# Patient Record
Sex: FEMALE | Race: WHITE | URBAN - METROPOLITAN AREA
[De-identification: names, ages, dates, MRNs, and addresses within clinical notes are randomized per-mention and may not be internally consistent; named-entity substitution may affect disease eponyms.]

---

## 2018-01-16 PROBLEM — Z3A.38 38 WEEKS GESTATION OF PREGNANCY: Status: ACTIVE | Noted: 2018-01-16

## 2018-01-22 PROBLEM — Z3A.39 39 WEEKS GESTATION OF PREGNANCY: Status: ACTIVE | Noted: 2018-01-22

## 2018-01-23 PROBLEM — Z3A.38 38 WEEKS GESTATION OF PREGNANCY: Status: RESOLVED | Noted: 2018-01-16 | Resolved: 2018-01-23

## 2019-01-25 LAB

## 2019-08-01 LAB
BASOPHILS ABSOLUTE: NORMAL
BASOPHILS RELATIVE PERCENT: NORMAL
EOSINOPHILS ABSOLUTE: NORMAL
EOSINOPHILS RELATIVE PERCENT: NORMAL
HCT VFR BLD CALC: NORMAL %
HEMOGLOBIN: NORMAL
LYMPHOCYTES ABSOLUTE: NORMAL
LYMPHOCYTES RELATIVE PERCENT: NORMAL
MCH RBC QN AUTO: NORMAL PG
MCHC RBC AUTO-ENTMCNC: NORMAL G/DL
MCV RBC AUTO: NORMAL FL
MONOCYTES ABSOLUTE: NORMAL
MONOCYTES RELATIVE PERCENT: NORMAL
NEUTROPHILS ABSOLUTE: NORMAL
NEUTROPHILS RELATIVE PERCENT: NORMAL
PLATELET # BLD: NORMAL 10*3/UL
PMV BLD AUTO: NORMAL FL
RBC # BLD: NORMAL 10*6/UL
VITAMIN B-12: 912
WBC # BLD: NORMAL 10*3/UL

## 2020-07-15 ENCOUNTER — OFFICE VISIT (OUTPATIENT)
Dept: CARDIOLOGY CLINIC | Age: 35
End: 2020-07-15
Payer: COMMERCIAL

## 2020-07-15 VITALS
SYSTOLIC BLOOD PRESSURE: 108 MMHG | HEART RATE: 79 BPM | BODY MASS INDEX: 22.62 KG/M2 | TEMPERATURE: 98.6 F | HEIGHT: 61 IN | RESPIRATION RATE: 16 BRPM | WEIGHT: 119.8 LBS | DIASTOLIC BLOOD PRESSURE: 80 MMHG

## 2020-07-15 PROCEDURE — 93000 ELECTROCARDIOGRAM COMPLETE: CPT | Performed by: INTERNAL MEDICINE

## 2020-07-15 PROCEDURE — 99204 OFFICE O/P NEW MOD 45 MIN: CPT | Performed by: INTERNAL MEDICINE

## 2020-07-15 RX ORDER — POTASSIUM CHLORIDE 750 MG/1
10 TABLET, EXTENDED RELEASE ORAL DAILY
Qty: 30 TABLET | Refills: 3 | Status: ON HOLD | OUTPATIENT
Start: 2020-07-15 | End: 2021-09-01 | Stop reason: HOSPADM

## 2020-07-15 RX ORDER — FUROSEMIDE 20 MG/1
20 TABLET ORAL DAILY
Qty: 30 TABLET | Refills: 3 | Status: ON HOLD | OUTPATIENT
Start: 2020-07-15 | End: 2021-09-01 | Stop reason: HOSPADM

## 2020-07-15 NOTE — PROGRESS NOTES
CHIEF COMPLAINT: Palpitations/Edema    HISTORY OF PRESENT ILLNESS: Patient is a 28 y.o. female seen at the request of Aba Avilez MD.      Palpitations. Started in January. Skipped beats, not racing. No syncope or near syncope. Edema. For several years. Intermittent. One son, no issues. Past Medical History:   Diagnosis Date    Clotting disorder [facility])        Patient Active Problem List   Diagnosis    39 weeks gestation of pregnancy       No Known Allergies    Current Outpatient Medications   Medication Sig Dispense Refill    ibuprofen (ADVIL;MOTRIN) 800 MG tablet Take 1 tablet by mouth every 8 hours as needed for Pain 40 tablet 1    aspirin 81 MG tablet Take 81 mg by mouth daily      Heparin Sodium, Porcine, (HEPARIN, PORCINE,) 00860 UNIT/ML injection Inject 0.25 mLs into the skin every 12 hours (Patient not taking: Reported on 7/15/2020) 30 vial 1    Insulin Syringe-Needle U-100 ([employer] INSULIN SYRINGE .5CC/30G) 30G X 1/2\" 0.5 ML MISC 1 each by Does not apply route daily (Patient not taking: Reported on 7/15/2020) 100 each 0    Prenatal Vit-Fe Fumarate-FA (PRENATAL VITAMIN PO) Take 1 tablet by mouth daily      ferrous sulfate 325 (65 Fe) MG tablet Take 325 mg by mouth Daily      docusate sodium (COLACE) 100 MG capsule Take 100 mg by mouth Daily      ranitidine (ZANTAC) 150 MG tablet Take 150 mg by mouth Daily       No current facility-administered medications for this visit.         Social History     Socioeconomic History    Marital status:      Spouse name: Not on file    Number of children: Not on file    Years of education: Not on file    Highest education level: Not on file   Occupational History    Not on file   Social Needs    Financial resource strain: Not on file    Food insecurity     Worry: Not on file     Inability: Not on file    Transportation needs     Medical: Not on file     Non-medical: Not on file   Tobacco Use    Smoking status: Former Smoker Packs/day: 0.50     Years: 10.00     Pack years: 5.00     Types: Cigarettes    Smokeless tobacco: Never Used   Substance and Sexual Activity    Alcohol use: No    Drug use: No    Sexual activity: Not on file   Lifestyle    Physical activity     Days per week: Not on file     Minutes per session: Not on file    Stress: Not on file   Relationships    Social connections     Talks on phone: Not on file     Gets together: Not on file     Attends [employer] service: Not on file     Active member of club or organization: Not on file     Attends meetings of clubs or organizations: Not on file     Relationship status: Not on file    Intimate partner violence     Fear of current or ex partner: Not on file     Emotionally abused: Not on file     Physically abused: Not on file     Forced sexual activity: Not on file   Other Topics Concern    Not on file   Social History Narrative    Not on file       Family History   Problem Relation Age of Onset    Asthma Mother     Heart Disease Mother     High Blood Pressure Mother     High Blood Pressure Father        Review of Systems:  Heart: as above   Lungs: as above   Eyes: denies changes in vision or discharge. Ears: denies changes in hearing or pain. Nose: denies epistaxis or masses   Throat: denies sore throat or trouble swallowing. Neuro: denies numbness, tingling, tremors. Skin: denies rashes or itching. : denies hematuria, dysuria   GI: denies vomiting, diarrhea   Psych: denies mood changed, anxiety, depression. All other systems negative. Physical Exam   /80   Pulse 79   Temp 98.6 °F (37 °C)   Resp 16   Ht 5' 1\" (1.549 m)   Wt 119 lb 12.8 oz (54.3 kg)   BMI 22.64 kg/m²   Constitutional: Oriented to person, place, and time. Well-developed and well-nourished. No distress. Head: Normocephalic and atraumatic. Eyes: EOM are normal. Pupils are equal, round, and reactive to light. Neck: Normal range of motion. Neck supple.  No hepatojugular reflux and no JVD present. Carotid bruit is not present. No tracheal deviation present. No thyromegaly present. Cardiovascular: Normal rate, regular rhythm, normal heart sounds and intact distal pulses. Exam reveals no gallop and no friction rub. No murmur heard. Pulmonary/Chest: Effort normal and breath sounds normal. No respiratory distress. No wheezes. No rales. No tenderness. Abdominal: Soft. Bowel sounds are normal. No distension and no mass. No tenderness. No rebound and no guarding. Musculoskeletal: Normal range of motion. No edema and no tenderness. Lymphadenopathy:   No cervical adenopathy. No groin adenopathy. Neurological: Alert and oriented to person, place, and time. Skin: Skin is warm and dry. No rash noted. Not diaphoretic. No erythema. Psychiatric: Normal mood and affect. Behavior is normal.     EKG:  normal sinus rhythm, nonspecific ST and T waves changes. ASSESSMENT AND PLAN:  Patient Active Problem List   Diagnosis    39 weeks gestation of pregnancy     1. Palpitations: Suggest PAC's or PVC's. Echo. Observe. 2. Edema: Echo. PRN lasix. 3. Hypercoag state: Takes ASA. Sho Deutsch D.O.   Cardiologist  Cardiology, [facility]

## 2021-02-11 VITALS
HEART RATE: 96 BPM | RESPIRATION RATE: 96 BRPM | BODY MASS INDEX: 23.92 KG/M2 | WEIGHT: 130 LBS | DIASTOLIC BLOOD PRESSURE: 90 MMHG | SYSTOLIC BLOOD PRESSURE: 140 MMHG | HEIGHT: 62 IN

## 2021-02-11 RX ORDER — LANOLIN ALCOHOL/MO/W.PET/CERES
1000 CREAM (GRAM) TOPICAL EVERY MORNING
COMMUNITY
End: 2022-11-02

## 2021-08-16 ENCOUNTER — HOSPITAL ENCOUNTER (OUTPATIENT)
Age: 36
Setting detail: OBSERVATION
Discharge: HOME | End: 2021-08-16
Attending: OBSTETRICS & GYNECOLOGY | Admitting: OBSTETRICS & GYNECOLOGY
Payer: COMMERCIAL

## 2021-08-16 VITALS
HEART RATE: 77 BPM | SYSTOLIC BLOOD PRESSURE: 125 MMHG | RESPIRATION RATE: 16 BRPM | DIASTOLIC BLOOD PRESSURE: 76 MMHG | TEMPERATURE: 98.1 F

## 2021-08-16 PROBLEM — O36.8390 NON-REASSURING FETAL HEART RATE OR RHYTHM AFFECTING MOTHER: Status: ACTIVE | Noted: 2021-08-16

## 2021-08-16 PROCEDURE — 99211 OFF/OP EST MAY X REQ PHY/QHP: CPT

## 2021-08-16 PROCEDURE — 59025 FETAL NON-STRESS TEST: CPT

## 2021-08-16 NOTE — PROGRESS NOTES
Pt is a  that presents to l&d from Dr. Lois Hernandez office for monitoring due to [address] . Pt denies any vb or lof. States +FM.  EFM applied

## 2021-08-16 NOTE — PROGRESS NOTES
Per Dr June Davis, NST reactive and pt may be discharged home at this time.  Follow up in the office for regularly scheduled appointment

## 2021-08-16 NOTE — H&P
Matias Rodriguez is a 39 y.o. female patient  who presented from office for prolonged monitoring due to NR NST in office being done for thrombophilia. No diagnosis found. Past Medical History:   Diagnosis Date    Clotting disorder ([address]     Palpitations      OB History        4    Para   1    Term   1            AB   2    Living           SAB   2    TAB        Ectopic        Molar        Multiple        Live Births                  37w0d  Estimated Date of Delivery: 21  No Known Allergies  Active Problems:    Non-reassuring fetal heart rate or rhythm affecting mother  Resolved Problems:    * No resolved hospital problems. *    unknown if currently breastfeeding.     History  Exam   Deferred  NST reactive with baseline of 130  Assessment & Plan  IUP @ 37 weeks  Thrombophilia  NR NST in office  NST now reactive, viewed by Dr Mary Dove for discharge  Rey Shelton MD  2021

## 2021-08-27 ENCOUNTER — ANESTHESIA EVENT (OUTPATIENT)
Dept: LABOR AND DELIVERY | Age: 36
End: 2021-08-27
Payer: COMMERCIAL

## 2021-08-30 ENCOUNTER — HOSPITAL ENCOUNTER (INPATIENT)
Age: 36
LOS: 2 days | Discharge: HOME | End: 2021-09-01
Attending: OBSTETRICS & GYNECOLOGY | Admitting: OBSTETRICS & GYNECOLOGY
Payer: COMMERCIAL

## 2021-08-30 ENCOUNTER — ANESTHESIA (OUTPATIENT)
Dept: LABOR AND DELIVERY | Age: 36
End: 2021-08-30
Payer: COMMERCIAL

## 2021-08-30 VITALS — SYSTOLIC BLOOD PRESSURE: 112 MMHG | OXYGEN SATURATION: 98 % | DIASTOLIC BLOOD PRESSURE: 60 MMHG

## 2021-08-30 DIAGNOSIS — G89.18 POST-OPERATIVE PAIN: Primary | ICD-10-CM

## 2021-08-30 LAB
ABO/RH: NORMAL
AMPHETAMINE SCREEN, URINE: NOT DETECTED
ANTIBODY SCREEN: NORMAL
APTT: 27.4 SEC (ref 24.5–35.1)
BARBITURATE SCREEN URINE: NOT DETECTED
BENZODIAZEPINE SCREEN, URINE: NOT DETECTED
CANNABINOID SCREEN URINE: NOT DETECTED
COCAINE METABOLITE SCREEN URINE: NOT DETECTED
FENTANYL SCREEN, URINE: NOT DETECTED
HCT VFR BLD CALC: 35.9 % (ref 34–48)
HEMOGLOBIN: 12.1 G/DL (ref 11.5–15.5)
INR BLD: 1
Lab: NORMAL
MCH RBC QN AUTO: 30.9 PG (ref 26–35)
MCHC RBC AUTO-ENTMCNC: 33.7 % (ref 32–34.5)
MCV RBC AUTO: 91.8 FL (ref 80–99.9)
METHADONE SCREEN, URINE: NOT DETECTED
OPIATE SCREEN URINE: NOT DETECTED
OXYCODONE URINE: NOT DETECTED
PDW BLD-RTO: 14.3 FL (ref 11.5–15)
PHENCYCLIDINE SCREEN URINE: NOT DETECTED
PLATELET # BLD: 203 E9/L (ref 130–450)
PMV BLD AUTO: 11.4 FL (ref 7–12)
PROTHROMBIN TIME: 11 SEC (ref 9.3–12.4)
RBC # BLD: 3.91 E12/L (ref 3.5–5.5)
WBC # BLD: 12.4 E9/L (ref 4.5–11.5)

## 2021-08-30 PROCEDURE — 7100000001 HC PACU RECOVERY - ADDTL 15 MIN: Performed by: OBSTETRICS & GYNECOLOGY

## 2021-08-30 PROCEDURE — 80307 DRUG TEST PRSMV CHEM ANLYZR: CPT

## 2021-08-30 PROCEDURE — 85610 PROTHROMBIN TIME: CPT

## 2021-08-30 PROCEDURE — 2709999900 HC NON-CHARGEABLE SUPPLY: Performed by: OBSTETRICS & GYNECOLOGY

## 2021-08-30 PROCEDURE — 6370000000 HC RX 637 (ALT 250 FOR IP)

## 2021-08-30 PROCEDURE — 2580000003 HC RX 258: Performed by: OBSTETRICS & GYNECOLOGY

## 2021-08-30 PROCEDURE — 6360000002 HC RX W HCPCS: Performed by: ANESTHESIOLOGY

## 2021-08-30 PROCEDURE — 85730 THROMBOPLASTIN TIME PARTIAL: CPT

## 2021-08-30 PROCEDURE — 86901 BLOOD TYPING SEROLOGIC RH(D): CPT

## 2021-08-30 PROCEDURE — 6360000002 HC RX W HCPCS: Performed by: OBSTETRICS & GYNECOLOGY

## 2021-08-30 PROCEDURE — 86900 BLOOD TYPING SEROLOGIC ABO: CPT

## 2021-08-30 PROCEDURE — 7100000000 HC PACU RECOVERY - FIRST 15 MIN: Performed by: OBSTETRICS & GYNECOLOGY

## 2021-08-30 PROCEDURE — 3700000000 HC ANESTHESIA ATTENDED CARE: Performed by: OBSTETRICS & GYNECOLOGY

## 2021-08-30 PROCEDURE — 2500000003 HC RX 250 WO HCPCS: Performed by: NURSE ANESTHETIST, CERTIFIED REGISTERED

## 2021-08-30 PROCEDURE — 3700000001 HC ADD 15 MINUTES (ANESTHESIA): Performed by: OBSTETRICS & GYNECOLOGY

## 2021-08-30 PROCEDURE — 85027 COMPLETE CBC AUTOMATED: CPT

## 2021-08-30 PROCEDURE — 86850 RBC ANTIBODY SCREEN: CPT

## 2021-08-30 PROCEDURE — 3609079900 HC CESAREAN SECTION: Performed by: OBSTETRICS & GYNECOLOGY

## 2021-08-30 PROCEDURE — 6360000002 HC RX W HCPCS

## 2021-08-30 PROCEDURE — 6360000002 HC RX W HCPCS: Performed by: NURSE ANESTHETIST, CERTIFIED REGISTERED

## 2021-08-30 PROCEDURE — 36415 COLL VENOUS BLD VENIPUNCTURE: CPT

## 2021-08-30 PROCEDURE — 1220000000 HC SEMI PRIVATE OB R&B

## 2021-08-30 RX ORDER — SODIUM CHLORIDE 9 MG/ML
25 INJECTION, SOLUTION INTRAVENOUS PRN
Status: DISCONTINUED | OUTPATIENT
Start: 2021-08-30 | End: 2021-09-02 | Stop reason: HOSPADM

## 2021-08-30 RX ORDER — ONDANSETRON 2 MG/ML
INJECTION INTRAMUSCULAR; INTRAVENOUS PRN
Status: DISCONTINUED | OUTPATIENT
Start: 2021-08-30 | End: 2021-08-30 | Stop reason: SDUPTHER

## 2021-08-30 RX ORDER — HYDROCODONE BITARTRATE AND ACETAMINOPHEN 5; 325 MG/1; MG/1
2 TABLET ORAL EVERY 4 HOURS PRN
Status: ACTIVE | OUTPATIENT
Start: 2021-08-30 | End: 2021-08-31

## 2021-08-30 RX ORDER — KETOROLAC TROMETHAMINE 30 MG/ML
15 INJECTION, SOLUTION INTRAMUSCULAR; INTRAVENOUS EVERY 6 HOURS PRN
Status: DISPENSED | OUTPATIENT
Start: 2021-08-30 | End: 2021-08-31

## 2021-08-30 RX ORDER — IBUPROFEN 600 MG/1
600 TABLET ORAL EVERY 6 HOURS PRN
Status: DISCONTINUED | OUTPATIENT
Start: 2021-08-30 | End: 2021-09-02 | Stop reason: HOSPADM

## 2021-08-30 RX ORDER — SODIUM CHLORIDE, SODIUM LACTATE, POTASSIUM CHLORIDE, CALCIUM CHLORIDE 600; 310; 30; 20 MG/100ML; MG/100ML; MG/100ML; MG/100ML
INJECTION, SOLUTION INTRAVENOUS CONTINUOUS
Status: DISCONTINUED | OUTPATIENT
Start: 2021-08-30 | End: 2021-08-30 | Stop reason: SDUPTHER

## 2021-08-30 RX ORDER — TRISODIUM CITRATE DIHYDRATE AND CITRIC ACID MONOHYDRATE 500; 334 MG/5ML; MG/5ML
30 SOLUTION ORAL ONCE
Status: COMPLETED | OUTPATIENT
Start: 2021-08-30 | End: 2021-08-30

## 2021-08-30 RX ORDER — SODIUM CHLORIDE 0.9 % (FLUSH) 0.9 %
10 SYRINGE (ML) INJECTION EVERY 12 HOURS SCHEDULED
Status: DISCONTINUED | OUTPATIENT
Start: 2021-08-30 | End: 2021-08-30 | Stop reason: SDUPTHER

## 2021-08-30 RX ORDER — DOCUSATE SODIUM 100 MG/1
100 CAPSULE, LIQUID FILLED ORAL 2 TIMES DAILY
Status: DISCONTINUED | OUTPATIENT
Start: 2021-08-30 | End: 2021-09-02 | Stop reason: HOSPADM

## 2021-08-30 RX ORDER — PROMETHAZINE HYDROCHLORIDE 25 MG/ML
6.25 INJECTION, SOLUTION INTRAMUSCULAR; INTRAVENOUS
Status: DISCONTINUED | OUTPATIENT
Start: 2021-08-30 | End: 2021-08-30 | Stop reason: HOSPADM

## 2021-08-30 RX ORDER — OXYCODONE HYDROCHLORIDE AND ACETAMINOPHEN 5; 325 MG/1; MG/1
2 TABLET ORAL EVERY 4 HOURS PRN
Status: DISCONTINUED | OUTPATIENT
Start: 2021-08-30 | End: 2021-09-02 | Stop reason: HOSPADM

## 2021-08-30 RX ORDER — BUPIVACAINE HYDROCHLORIDE 7.5 MG/ML
INJECTION, SOLUTION INTRASPINAL PRN
Status: DISCONTINUED | OUTPATIENT
Start: 2021-08-30 | End: 2021-08-30 | Stop reason: SDUPTHER

## 2021-08-30 RX ORDER — TRISODIUM CITRATE DIHYDRATE AND CITRIC ACID MONOHYDRATE 500; 334 MG/5ML; MG/5ML
SOLUTION ORAL
Status: COMPLETED
Start: 2021-08-30 | End: 2021-08-30

## 2021-08-30 RX ORDER — FENTANYL CITRATE 50 UG/ML
50 INJECTION, SOLUTION INTRAMUSCULAR; INTRAVENOUS EVERY 5 MIN PRN
Status: DISCONTINUED | OUTPATIENT
Start: 2021-08-30 | End: 2021-08-30 | Stop reason: HOSPADM

## 2021-08-30 RX ORDER — SODIUM CHLORIDE 0.9 % (FLUSH) 0.9 %
5-40 SYRINGE (ML) INJECTION EVERY 12 HOURS SCHEDULED
Status: DISCONTINUED | OUTPATIENT
Start: 2021-08-30 | End: 2021-09-02 | Stop reason: HOSPADM

## 2021-08-30 RX ORDER — ONDANSETRON 2 MG/ML
4 INJECTION INTRAMUSCULAR; INTRAVENOUS EVERY 6 HOURS PRN
Status: DISCONTINUED | OUTPATIENT
Start: 2021-08-30 | End: 2021-08-30 | Stop reason: HOSPADM

## 2021-08-30 RX ORDER — OXYCODONE HYDROCHLORIDE AND ACETAMINOPHEN 5; 325 MG/1; MG/1
1 TABLET ORAL EVERY 4 HOURS PRN
Status: DISCONTINUED | OUTPATIENT
Start: 2021-08-30 | End: 2021-09-02 | Stop reason: HOSPADM

## 2021-08-30 RX ORDER — SODIUM CHLORIDE 0.9 % (FLUSH) 0.9 %
10 SYRINGE (ML) INJECTION PRN
Status: DISCONTINUED | OUTPATIENT
Start: 2021-08-30 | End: 2021-08-30 | Stop reason: SDUPTHER

## 2021-08-30 RX ORDER — KETOROLAC TROMETHAMINE 30 MG/ML
INJECTION, SOLUTION INTRAMUSCULAR; INTRAVENOUS PRN
Status: DISCONTINUED | OUTPATIENT
Start: 2021-08-30 | End: 2021-08-30 | Stop reason: SDUPTHER

## 2021-08-30 RX ORDER — SODIUM CHLORIDE, SODIUM LACTATE, POTASSIUM CHLORIDE, AND CALCIUM CHLORIDE .6; .31; .03; .02 G/100ML; G/100ML; G/100ML; G/100ML
1000 INJECTION, SOLUTION INTRAVENOUS ONCE
Status: COMPLETED | OUTPATIENT
Start: 2021-08-30 | End: 2021-08-30

## 2021-08-30 RX ORDER — SODIUM CHLORIDE, SODIUM LACTATE, POTASSIUM CHLORIDE, CALCIUM CHLORIDE 600; 310; 30; 20 MG/100ML; MG/100ML; MG/100ML; MG/100ML
INJECTION, SOLUTION INTRAVENOUS CONTINUOUS
Status: DISCONTINUED | OUTPATIENT
Start: 2021-08-30 | End: 2021-09-02 | Stop reason: HOSPADM

## 2021-08-30 RX ORDER — ONDANSETRON 4 MG/1
4 TABLET, ORALLY DISINTEGRATING ORAL EVERY 8 HOURS PRN
Status: DISCONTINUED | OUTPATIENT
Start: 2021-08-30 | End: 2021-09-02 | Stop reason: HOSPADM

## 2021-08-30 RX ORDER — PHENYLEPHRINE HCL IN 0.9% NACL 1 MG/10 ML
SYRINGE (ML) INTRAVENOUS PRN
Status: DISCONTINUED | OUTPATIENT
Start: 2021-08-30 | End: 2021-08-30 | Stop reason: SDUPTHER

## 2021-08-30 RX ORDER — MODIFIED LANOLIN
OINTMENT (GRAM) TOPICAL
Status: DISCONTINUED | OUTPATIENT
Start: 2021-08-30 | End: 2021-09-02 | Stop reason: HOSPADM

## 2021-08-30 RX ORDER — SODIUM CHLORIDE 9 MG/ML
25 INJECTION, SOLUTION INTRAVENOUS PRN
Status: DISCONTINUED | OUTPATIENT
Start: 2021-08-30 | End: 2021-08-30 | Stop reason: SDUPTHER

## 2021-08-30 RX ORDER — SODIUM CHLORIDE 0.9 % (FLUSH) 0.9 %
5-40 SYRINGE (ML) INJECTION PRN
Status: DISCONTINUED | OUTPATIENT
Start: 2021-08-30 | End: 2021-09-02 | Stop reason: HOSPADM

## 2021-08-30 RX ORDER — FERROUS SULFATE 325(65) MG
325 TABLET ORAL 2 TIMES DAILY WITH MEALS
Status: DISCONTINUED | OUTPATIENT
Start: 2021-08-31 | End: 2021-09-02 | Stop reason: HOSPADM

## 2021-08-30 RX ORDER — FENTANYL CITRATE 50 UG/ML
25 INJECTION, SOLUTION INTRAMUSCULAR; INTRAVENOUS EVERY 5 MIN PRN
Status: DISCONTINUED | OUTPATIENT
Start: 2021-08-30 | End: 2021-08-30 | Stop reason: HOSPADM

## 2021-08-30 RX ORDER — MORPHINE SULFATE 1 MG/ML
INJECTION, SOLUTION EPIDURAL; INTRATHECAL; INTRAVENOUS PRN
Status: DISCONTINUED | OUTPATIENT
Start: 2021-08-30 | End: 2021-08-30 | Stop reason: SDUPTHER

## 2021-08-30 RX ORDER — NALBUPHINE HCL 10 MG/ML
5 AMPUL (ML) INJECTION EVERY 4 HOURS PRN
Status: DISCONTINUED | OUTPATIENT
Start: 2021-08-30 | End: 2021-08-30 | Stop reason: HOSPADM

## 2021-08-30 RX ORDER — PRENATAL WITH FERROUS FUM AND FOLIC ACID 3080; 920; 120; 400; 22; 1.84; 3; 20; 10; 1; 12; 200; 27; 25; 2 [IU]/1; [IU]/1; MG/1; [IU]/1; MG/1; MG/1; MG/1; MG/1; MG/1; MG/1; UG/1; MG/1; MG/1; MG/1; MG/1
1 TABLET ORAL DAILY
Status: DISCONTINUED | OUTPATIENT
Start: 2021-08-31 | End: 2021-09-02 | Stop reason: HOSPADM

## 2021-08-30 RX ORDER — ONDANSETRON 2 MG/ML
4 INJECTION INTRAMUSCULAR; INTRAVENOUS EVERY 6 HOURS PRN
Status: ACTIVE | OUTPATIENT
Start: 2021-08-30 | End: 2021-08-31

## 2021-08-30 RX ORDER — HYDROCODONE BITARTRATE AND ACETAMINOPHEN 5; 325 MG/1; MG/1
1 TABLET ORAL EVERY 4 HOURS PRN
Status: DISPENSED | OUTPATIENT
Start: 2021-08-30 | End: 2021-08-31

## 2021-08-30 RX ORDER — SIMETHICONE 80 MG
80 TABLET,CHEWABLE ORAL EVERY 6 HOURS PRN
Status: DISCONTINUED | OUTPATIENT
Start: 2021-08-30 | End: 2021-09-02 | Stop reason: HOSPADM

## 2021-08-30 RX ORDER — ACETAMINOPHEN 325 MG/1
650 TABLET ORAL EVERY 4 HOURS PRN
Status: DISCONTINUED | OUTPATIENT
Start: 2021-08-30 | End: 2021-09-02 | Stop reason: HOSPADM

## 2021-08-30 RX ORDER — DIPHENHYDRAMINE HYDROCHLORIDE 50 MG/ML
25 INJECTION INTRAMUSCULAR; INTRAVENOUS EVERY 6 HOURS PRN
Status: DISCONTINUED | OUTPATIENT
Start: 2021-08-30 | End: 2021-08-31

## 2021-08-30 RX ADMIN — SODIUM CITRATE AND CITRIC ACID MONOHYDRATE 30 ML: 500; 334 SOLUTION ORAL at 19:19

## 2021-08-30 RX ADMIN — Medication 100 MCG: at 19:56

## 2021-08-30 RX ADMIN — SODIUM CHLORIDE, POTASSIUM CHLORIDE, SODIUM LACTATE AND CALCIUM CHLORIDE 1000 ML: 600; 310; 30; 20 INJECTION, SOLUTION INTRAVENOUS at 17:00

## 2021-08-30 RX ADMIN — Medication 100 MCG: at 19:37

## 2021-08-30 RX ADMIN — Medication 100 MCG: at 20:00

## 2021-08-30 RX ADMIN — SODIUM CHLORIDE, POTASSIUM CHLORIDE, SODIUM LACTATE AND CALCIUM CHLORIDE: 600; 310; 30; 20 INJECTION, SOLUTION INTRAVENOUS at 19:01

## 2021-08-30 RX ADMIN — ONDANSETRON 4 MG: 2 INJECTION INTRAMUSCULAR; INTRAVENOUS at 19:45

## 2021-08-30 RX ADMIN — Medication 999 ML/HR: at 19:45

## 2021-08-30 RX ADMIN — KETOROLAC TROMETHAMINE 30 MG: 30 INJECTION, SOLUTION INTRAMUSCULAR; INTRAVENOUS at 20:13

## 2021-08-30 RX ADMIN — Medication 100 MCG: at 19:35

## 2021-08-30 RX ADMIN — BUPIVACAINE HYDROCHLORIDE IN DEXTROSE 1.6 ML: 7.5 INJECTION, SOLUTION SUBARACHNOID at 19:31

## 2021-08-30 RX ADMIN — TRISODIUM CITRATE DIHYDRATE AND CITRIC ACID MONOHYDRATE 30 ML: 500; 334 SOLUTION ORAL at 19:19

## 2021-08-30 RX ADMIN — SODIUM CHLORIDE, POTASSIUM CHLORIDE, SODIUM LACTATE AND CALCIUM CHLORIDE: 600; 310; 30; 20 INJECTION, SOLUTION INTRAVENOUS at 18:01

## 2021-08-30 RX ADMIN — NALBUPHINE HYDROCHLORIDE 5 MG: 10 INJECTION, SOLUTION INTRAMUSCULAR; INTRAVENOUS; SUBCUTANEOUS at 22:01

## 2021-08-30 RX ADMIN — Medication 2000 MG: at 19:20

## 2021-08-30 RX ADMIN — MORPHINE SULFATE 0.15 MG: 1 INJECTION, SOLUTION EPIDURAL; INTRATHECAL; INTRAVENOUS at 19:31

## 2021-08-30 ASSESSMENT — PULMONARY FUNCTION TESTS
PIF_VALUE: 0
PIF_VALUE: 1
PIF_VALUE: 0
PIF_VALUE: 1
PIF_VALUE: 0
PIF_VALUE: 1

## 2021-08-30 ASSESSMENT — PAIN SCALES - GENERAL: PAINLEVEL_OUTOF10: 5

## 2021-08-30 NOTE — ANESTHESIA PROCEDURE NOTES
Spinal Block    Patient location during procedure: OR  Reason for block: primary anesthetic  Staffing  Performed: resident/CRNA   Anesthesiologist: Ophelia Miles MD  Resident/CRNA: ODALIS Royal - CRNA  Preanesthetic Checklist  Completed: patient identified, IV checked, site marked, risks and benefits discussed, surgical consent, monitors and equipment checked, pre-op evaluation, timeout performed, anesthesia consent given, oxygen available and patient being monitored  Spinal Block  Patient position: sitting  Prep: ChloraPrep  Patient monitoring: continuous pulse ox and frequent blood pressure checks  Approach: midline  Location: L3/L4  Provider prep: mask and sterile gloves  Local infiltration: lidocaine  Agent: bupivacaine  Adjuvant: duramorph  Needle  Needle type: Pencan   Needle gauge: 25 G  Needle length: 3.5 in  Assessment  Swirl obtained: Yes  CSF: clear  Attempts: 1  Hemodynamics: stable

## 2021-08-30 NOTE — ANESTHESIA PRE PROCEDURE
Department of Anesthesiology  Preprocedure Note       Name:  Karina Wallace   Age:  39 y.o.  :  1985                                          MRN:  [id number]         Date:  2021      Surgeon: Jeffery Hannah):  Suzy Hernandez MD    Procedure: Procedure(s):   SECTION    Medications prior to admission:   Prior to Admission medications    Medication Sig Start Date End Date Taking?  Authorizing Provider   enoxaparin (LOVENOX) 40 MG/0.4ML injection Inject into the skin daily   Yes Historical Provider, MD   Prenatal Vit-Fe Fumarate-FA (PRENATAL VITAMIN PO) Take 1 tablet by mouth daily   Yes Historical Provider, MD   aspirin 81 MG tablet Take 81 mg by mouth daily   Yes Historical Provider, MD   vitamin B-12 (CYANOCOBALAMIN) 1000 MCG tablet Take 1,000 mcg by mouth every morning 1 PILL IN THE MORNING    Historical Provider, MD   furosemide (LASIX) 20 MG tablet Take 1 tablet by mouth daily 7/15/20   Milo Fernandez DO   potassium chloride (KLOR-CON M) 10 MEQ extended release tablet Take 1 tablet by mouth daily 7/15/20   Milo Fernandez DO   Heparin Sodium, Porcine, (HEPARIN, PORCINE,) 73054 UNIT/ML injection Inject 0.25 mLs into the skin every 12 hours  Patient not taking: Reported on 7/15/2020 1/27/18   Suzy Hernandez MD   ibuprofen (ADVIL;MOTRIN) 800 MG tablet Take 1 tablet by mouth every 8 hours as needed for Pain 18   Suzy Hernandez MD   Insulin Syringe-Needle U-100 ([employer]S INSULIN SYRINGE .5CC/30G) 30G X 1/2\" 0.5 ML MISC 1 each by Does not apply route daily  Patient not taking: Reported on 7/15/2020 1/27/18   Suzy Hernandez MD   ferrous sulfate 325 (65 Fe) MG tablet Take 325 mg by mouth Daily    Historical Provider, MD   docusate sodium (COLACE) 100 MG capsule Take 100 mg by mouth Daily    Historical Provider, MD   ranitidine (ZANTAC) 150 MG tablet Take 150 mg by mouth Daily    Historical Provider, MD       Current medications:    Current Facility-Administered Medications   Medication Dose Route Frequency Provider Last Rate Last Admin    citric acid-sodium citrate (BICITRA) 500-334 MG/5ML solution             ceFAZolin 2000 mg in 20 mL SWFI IV Syringe IV syringe             lactated ringers infusion   IntraVENous Continuous Vish Chakraborty MD        lactated ringers bolus  1,000 mL IntraVENous Once Vish Chakraborty MD        sodium chloride flush 0.9 % injection 10 mL  10 mL IntraVENous 2 times per day Vish Chakraborty MD        sodium chloride flush 0.9 % injection 10 mL  10 mL IntraVENous PRN Vish Chakraborty MD        0.9 % sodium chloride infusion  25 mL IntraVENous PRN Vish Chakraborty MD        citric acid-sodium citrate (BICITRA) solution 30 mL  30 mL Oral Once Vish Chakraborty MD        ceFAZolin (ANCEF) 2000 mg in sterile water 20 mL IV syringe  2,000 mg IntraVENous Once Vish Chakraborty MD        oxytocin (PITOCIN) 30 units in 500 mL infusion  87.3 razia-units/min IntraVENous Continuous PRN Vish Chakraborty MD        And    oxytocin (PITOCIN) 10 unit bolus from the bag  10 Units IntraVENous PRN Vish Chakraborty MD           Allergies:  No Known Allergies    Problem List:    Patient Active Problem List   Diagnosis Code    39 weeks gestation of pregnancy Z3A.39    Non-reassuring fetal heart rate or rhythm affecting mother N66.6232       Past Medical History:        Diagnosis Date    Clotting disorder ([address]     Palpitations        Past Surgical History:        Procedure Laterality Date     SECTION, LOW TRANSVERSE         Social History:    Social History     Tobacco Use    Smoking status: Former Smoker     Packs/day: 0.50     Years: 10.00     Pack years: 5.00     Types: Cigarettes    Smokeless tobacco: Never Used   Substance Use Topics    Alcohol use:  No                                Counseling given: Not Answered      Vital Signs (Current):   Vitals:    21 1657 21 1700   BP: 122/83    Pulse: 95    Resp: 16    Temp: 36.8 °C (98.2 °F)    TempSrc: Oral SpO2: 99%    Weight:  172 lb (78 kg)   Height:  5' 1\" (1.549 m)                                              BP Readings from Last 3 Encounters:   08/30/21 122/83   08/16/21 125/76   12/05/19 (!) 140/90       NPO Status:                                                                                 BMI:   Wt Readings from Last 3 Encounters:   08/30/21 172 lb (78 kg)   12/05/19 130 lb (59 kg)   07/15/20 119 lb 12.8 oz (54.3 kg)     Body mass index is 32.5 kg/m². CBC:   Lab Results   Component Value Date    WBC 12.4 08/30/2021    RBC 3.91 08/30/2021    HGB 12.1 08/30/2021    HCT 35.9 08/30/2021    MCV 91.8 08/30/2021    RDW 14.3 08/30/2021     08/30/2021       CMP:   Lab Results   Component Value Date     01/25/2018    K 3.8 01/25/2018    CL 99 01/25/2018    CO2 24 01/25/2018    BUN 10 01/25/2018    CREATININE 0.6 01/25/2018    GFRAA >60 01/25/2018    LABGLOM >60 01/25/2018    GLUCOSE 101 01/25/2018    PROT 6.0 01/25/2018    CALCIUM 9.1 01/25/2018    BILITOT 0.2 01/25/2018    ALKPHOS 132 01/25/2018    AST 41 01/25/2018    ALT 20 01/25/2018       POC Tests: No results for input(s): POCGLU, POCNA, POCK, POCCL, POCBUN, POCHEMO, POCHCT in the last 72 hours.     Coags:   Lab Results   Component Value Date    PROTIME 11.4 01/22/2018    INR 1.0 01/22/2018       HCG (If Applicable):   Lab Results   Component Value Date    PREGTESTUR positive 03/31/2016        ABGs: No results found for: PHART, PO2ART, WSH4VFQ, FVI9MOQ, BEART, E6ZCVQMY     Type & Screen (If Applicable):  No results found for: LABABO, LABRH    Drug/Infectious Status (If Applicable):  No results found for: HIV, HEPCAB    COVID-19 Screening (If Applicable): No results found for: COVID19        Anesthesia Evaluation  Patient summary reviewed and Nursing notes reviewed no history of anesthetic complications:   Airway: Mallampati: I  TM distance: >3 FB   Neck ROM: full  Mouth opening: > = 3 FB Dental: normal exam         Pulmonary:Negative Pulmonary ROS and normal exam                               Cardiovascular:Negative CV ROS            Rhythm: regular  Rate: normal                    Neuro/Psych:   Negative Neuro/Psych ROS              GI/Hepatic/Renal: Neg GI/Hepatic/Renal ROS            Endo/Other:    (+) blood dyscrasia: anticoagulation therapy:., .                  ROS comment: LD of lovenox was 8/29 at 1430 Abdominal:             Vascular: negative vascular ROS. Other Findings:             Anesthesia Plan      spinal     ASA 2     (Patient here for repeat csection)        Anesthetic plan and risks discussed with patient. Plan discussed with CRNA. Attending anesthesiologist reviewed and agrees with Preprocedure content              ODALIS Estrada - CRNA   8/30/2021      DOS STAFF ADDENDUM:    Pt seen and examined, physical exam updated, chart reviewed including anesthesia, drug and allergy history. H&P reviewed. No interval changes to history or physical examination (unless noted above). NPO status confirmed. Anesthetic plan, risks, benefits, alternatives discussed with patient. Patient verbalized an understanding and agrees to proceed.      Latricia Shaffer MD   Anesthesiologist

## 2021-08-31 LAB
HCT VFR BLD CALC: 29.5 % (ref 34–48)
HEMOGLOBIN: 9.8 G/DL (ref 11.5–15.5)
MCH RBC QN AUTO: 31.2 PG (ref 26–35)
MCHC RBC AUTO-ENTMCNC: 33.2 % (ref 32–34.5)
MCV RBC AUTO: 93.9 FL (ref 80–99.9)
PDW BLD-RTO: 14.3 FL (ref 11.5–15)
PLATELET # BLD: 182 E9/L (ref 130–450)
PMV BLD AUTO: 11.6 FL (ref 7–12)
RBC # BLD: 3.14 E12/L (ref 3.5–5.5)
WBC # BLD: 11.4 E9/L (ref 4.5–11.5)

## 2021-08-31 PROCEDURE — 2580000003 HC RX 258: Performed by: OBSTETRICS & GYNECOLOGY

## 2021-08-31 PROCEDURE — 90471 IMMUNIZATION ADMIN: CPT | Performed by: OBSTETRICS & GYNECOLOGY

## 2021-08-31 PROCEDURE — 1220000000 HC SEMI PRIVATE OB R&B

## 2021-08-31 PROCEDURE — 36415 COLL VENOUS BLD VENIPUNCTURE: CPT

## 2021-08-31 PROCEDURE — 90715 TDAP VACCINE 7 YRS/> IM: CPT | Performed by: OBSTETRICS & GYNECOLOGY

## 2021-08-31 PROCEDURE — 6360000002 HC RX W HCPCS: Performed by: ANESTHESIOLOGY

## 2021-08-31 PROCEDURE — 6360000002 HC RX W HCPCS: Performed by: OBSTETRICS & GYNECOLOGY

## 2021-08-31 PROCEDURE — 6370000000 HC RX 637 (ALT 250 FOR IP): Performed by: OBSTETRICS & GYNECOLOGY

## 2021-08-31 PROCEDURE — 6370000000 HC RX 637 (ALT 250 FOR IP): Performed by: ANESTHESIOLOGY

## 2021-08-31 PROCEDURE — 85027 COMPLETE CBC AUTOMATED: CPT

## 2021-08-31 RX ORDER — DIPHENHYDRAMINE HYDROCHLORIDE 50 MG/ML
12.5 INJECTION INTRAMUSCULAR; INTRAVENOUS EVERY 6 HOURS PRN
Status: DISCONTINUED | OUTPATIENT
Start: 2021-08-31 | End: 2021-09-02 | Stop reason: HOSPADM

## 2021-08-31 RX ORDER — NALBUPHINE HCL 10 MG/ML
5 AMPUL (ML) INJECTION EVERY 4 HOURS PRN
Status: DISCONTINUED | OUTPATIENT
Start: 2021-08-31 | End: 2021-09-02 | Stop reason: HOSPADM

## 2021-08-31 RX ADMIN — SODIUM CHLORIDE, PRESERVATIVE FREE 10 ML: 5 INJECTION INTRAVENOUS at 16:33

## 2021-08-31 RX ADMIN — KETOROLAC TROMETHAMINE 15 MG: 30 INJECTION, SOLUTION INTRAMUSCULAR at 03:52

## 2021-08-31 RX ADMIN — DIPHENHYDRAMINE HYDROCHLORIDE 12.5 MG: 50 INJECTION, SOLUTION INTRAMUSCULAR; INTRAVENOUS at 10:14

## 2021-08-31 RX ADMIN — TETANUS TOXOID, REDUCED DIPHTHERIA TOXOID AND ACELLULAR PERTUSSIS VACCINE, ADSORBED 0.5 ML: 5; 2.5; 8; 8; 2.5 SUSPENSION INTRAMUSCULAR at 19:50

## 2021-08-31 RX ADMIN — DIPHENHYDRAMINE HYDROCHLORIDE 25 MG: 50 INJECTION, SOLUTION INTRAMUSCULAR; INTRAVENOUS at 01:59

## 2021-08-31 RX ADMIN — DOCUSATE SODIUM 100 MG: 100 CAPSULE ORAL at 09:00

## 2021-08-31 RX ADMIN — FERROUS SULFATE TAB 325 MG (65 MG ELEMENTAL FE) 325 MG: 325 (65 FE) TAB at 19:05

## 2021-08-31 RX ADMIN — METFORMIN HYDROCHLORIDE 1 TABLET: 500 TABLET, EXTENDED RELEASE ORAL at 08:59

## 2021-08-31 RX ADMIN — ENOXAPARIN SODIUM 40 MG: 40 INJECTION SUBCUTANEOUS at 19:49

## 2021-08-31 RX ADMIN — HYDROCODONE BITARTRATE AND ACETAMINOPHEN 1 TABLET: 5; 325 TABLET ORAL at 19:49

## 2021-08-31 RX ADMIN — KETOROLAC TROMETHAMINE 15 MG: 30 INJECTION, SOLUTION INTRAMUSCULAR at 16:33

## 2021-08-31 RX ADMIN — IBUPROFEN 600 MG: 600 TABLET, FILM COATED ORAL at 22:21

## 2021-08-31 RX ADMIN — SERTRALINE HYDROCHLORIDE 50 MG: 50 TABLET ORAL at 19:05

## 2021-08-31 RX ADMIN — DOCUSATE SODIUM 100 MG: 100 CAPSULE ORAL at 19:49

## 2021-08-31 RX ADMIN — KETOROLAC TROMETHAMINE 15 MG: 30 INJECTION, SOLUTION INTRAMUSCULAR at 10:13

## 2021-08-31 RX ADMIN — FERROUS SULFATE TAB 325 MG (65 MG ELEMENTAL FE) 325 MG: 325 (65 FE) TAB at 09:00

## 2021-08-31 ASSESSMENT — PAIN DESCRIPTION - PROGRESSION: CLINICAL_PROGRESSION: GRADUALLY WORSENING

## 2021-08-31 ASSESSMENT — PAIN DESCRIPTION - ONSET: ONSET: GRADUAL

## 2021-08-31 ASSESSMENT — PAIN SCALES - GENERAL
PAINLEVEL_OUTOF10: 5
PAINLEVEL_OUTOF10: 3
PAINLEVEL_OUTOF10: 6
PAINLEVEL_OUTOF10: 3
PAINLEVEL_OUTOF10: 6

## 2021-08-31 ASSESSMENT — PAIN DESCRIPTION - DESCRIPTORS: DESCRIPTORS: ACHING;DISCOMFORT

## 2021-08-31 ASSESSMENT — PAIN DESCRIPTION - PAIN TYPE: TYPE: SURGICAL PAIN

## 2021-08-31 ASSESSMENT — PAIN - FUNCTIONAL ASSESSMENT: PAIN_FUNCTIONAL_ASSESSMENT: ACTIVITIES ARE NOT PREVENTED

## 2021-08-31 ASSESSMENT — PAIN DESCRIPTION - LOCATION: LOCATION: ABDOMEN;INCISION

## 2021-08-31 ASSESSMENT — PAIN DESCRIPTION - ORIENTATION: ORIENTATION: LOWER

## 2021-08-31 ASSESSMENT — PAIN DESCRIPTION - FREQUENCY: FREQUENCY: INTERMITTENT

## 2021-08-31 NOTE — H&P
Department of Obstetrics & Gynecology  OBSTETRICAL ADMISSION  HISTORY & PHYSICAL      CHIEF COMPLAINT:  Repeat C/Section  Chief Complaint   Patient presents with    Scheduled        HISTORY OF PRESENT ILLNESS:    The patient is a 39 y.o. female, , who is 39w0d, and is being admitted for a Repeat C/Section  Chief Complaint   Patient presents with    Scheduled    . Estimated Due Date: Estimated Date of Delivery: 21    PRENATAL CARE:  Complicated by: Thrombophilia    PAST OB HISTORY  OB History        4    Para   1    Term   1            AB   2    Living           SAB   2    TAB        Ectopic        Molar        Multiple        Live Births                    Past Medical History:        Diagnosis Date    Clotting disorder ([facility]     Palpitations      Past Surgical History:        Procedure Laterality Date     SECTION, LOW TRANSVERSE       Allergies:  Patient has no known allergies. Social History:    Social History     Socioeconomic History    Marital status:      Spouse name: Not on file    Number of children: Not on file    Years of education: Not on file    Highest education level: Not on file   Occupational History    Not on file   Tobacco Use    Smoking status: Former Smoker     Packs/day: 0.50     Years: 10.00     Pack years: 5.00     Types: Cigarettes    Smokeless tobacco: Never Used   Vaping Use    Vaping Use: Never used   Substance and Sexual Activity    Alcohol use: No    Drug use: No    Sexual activity: Not on file   Other Topics Concern    Not on file   Social History Narrative    Not on file     Social Determinants of Health     Financial Resource Strain:     Difficulty of Paying Living Expenses:    Food Insecurity:     Worried About [address] in the Last Year:     [address] in the Last Year:    Transportation Needs:     Lack of Transportation (Medical):      Lack of Transportation (Non-Medical): Physical Activity:     Days of Exercise per Week:     Minutes of Exercise per Session:    Stress:     Feeling of Stress :    Social Connections:     Frequency of Communication with Friends and Family:     Frequency of Social Gatherings with Friends and Family:     Attends [employer]ani Services:     Active Member of Clubs or Organizations:     Attends Club or Organization Meetings:     Marital Status:    Intimate Partner Violence:     Fear of Current or Ex-Partner:     Emotionally Abused:     Physically Abused:     Sexually Abused:      Family History:       Problem Relation Age of Onset    Asthma Mother     Heart Disease Mother     High Blood Pressure Mother     High Blood Pressure Father      Medications Prior to Admission:  Medications Prior to Admission: enoxaparin (LOVENOX) 40 MG/0.4ML injection, Inject into the skin daily  Prenatal Vit-Fe Fumarate-FA (PRENATAL VITAMIN PO), Take 1 tablet by mouth daily  aspirin 81 MG tablet, Take 81 mg by mouth daily  vitamin B-12 (CYANOCOBALAMIN) 1000 MCG tablet, Take 1,000 mcg by mouth every morning 1 PILL IN THE MORNING  furosemide (LASIX) 20 MG tablet, Take 1 tablet by mouth daily  potassium chloride (KLOR-CON M) 10 MEQ extended release tablet, Take 1 tablet by mouth daily  Heparin Sodium, Porcine, (HEPARIN, PORCINE,) 67306 UNIT/ML injection, Inject 0.25 mLs into the skin every 12 hours (Patient not taking: Reported on 7/15/2020)  ibuprofen (ADVIL;MOTRIN) 800 MG tablet, Take 1 tablet by mouth every 8 hours as needed for Pain  Insulin Syringe-Needle U-100 ([employer]S INSULIN SYRINGE .5CC/30G) 30G X 1/2\" 0.5 ML MISC, 1 each by Does not apply route daily (Patient not taking: Reported on 7/15/2020)  ferrous sulfate 325 (65 Fe) MG tablet, Take 325 mg by mouth Daily  docusate sodium (COLACE) 100 MG capsule, Take 100 mg by mouth Daily  ranitidine (ZANTAC) 150 MG tablet, Take 150 mg by mouth Daily    REVIEW OF SYSTEMS:  CONSTITUTIONAL:  negative  RESPIRATORY: negative  CARDIOVASCULAR:  negative  GASTROINTESTINAL:  negative  ALLERGIC/IMMUNOLOGIC:  negative  NEUROLOGICAL:  negative  BEHAVIOR/PSYCH:  negative    PHYSICAL EXAM:  Vitals:    08/30/21 1657 08/30/21 1700   BP: 122/83    Pulse: 95    Resp: 16    Temp: 98.2 °F (36.8 °C)    TempSrc: Oral    SpO2: 99%    Weight:  172 lb (78 kg)   Height:  5' 1\" (1.549 m)     General appearance:  awake, alert, cooperative, no apparent distress, and appears stated age  Neurologic:  Awake, alert, oriented to name, place and time. Lungs:  No increased work of breathing, good air exchange  Abdomen:  Soft, non tender, gravid, consistent with her gestational age. Fetal heart rate:  Reassuring.   Pelvis:  Adequate pelvis  Cervix: Closed 25% medium -2  Contraction frequency:  0 minutes    Membranes:  Intact    ASSESSMENT: 39w0d, Previous C/Section    PLAN: Repeat C/Section    Trae Montenegro MD, [facility]

## 2021-08-31 NOTE — PROGRESS NOTES
[facility] [place] [facility] screening results were discussed with parent. Questions answered. Brochure given to parent. Advised to monitor developmental milestones and contact physician for any concerns.    Gabby Castellon

## 2021-08-31 NOTE — ANESTHESIA POSTPROCEDURE EVALUATION
Department of Anesthesiology  Postprocedure Note    Patient: Rosarua Ordaz  MRN: [id number]  YOB: 1985  Date of evaluation: 2021  Time:  10:03 AM     Procedure Summary     Date: 21 Room / Location: [facility]  [address] [place] [address]    Anesthesia Start: 49 Anesthesia Stop:     Procedure:  SECTION (N/A Uterus) Diagnosis: (csection)    Surgeons: Benjamin Wagner MD Responsible Provider: Lauren Ramos MD    Anesthesia Type: spinal ASA Status: 2          Anesthesia Type: spinal    Lor Phase I: Lor Score: 10    Lor Phase II:      Last vitals: Reviewed and per EMR flowsheets.        Anesthesia Post Evaluation    Patient location during evaluation: bedside  Patient participation: complete - patient participated  Level of consciousness: awake and alert  Pain score: 0  Airway patency: patent  Nausea & Vomiting: no nausea and no vomiting  Complications: no  Cardiovascular status: hemodynamically stable  Respiratory status: acceptable, room air and spontaneous ventilation  Hydration status: stable

## 2021-08-31 NOTE — PROCEDURES
Department of Obstetrics and Gynecology  [facility] SECTION  Operative Dictation    Indications: previous uterine incision    Pre-operative Diagnosis: 39 week 0 day pregnancy. Post-operative Diagnosis: Living  infant(s) and Male    Surgeon: Chavo Deleon MD     Assistants: Mely Rowe DO    Anesthesia: Spinal anesthesia    Procedure Details   The patient was seen in the Holding Room. The risks, benefits, complications, treatment options, and expected outcomes were discussed with the patient. The patient concurred with the proposed plan, giving informed consent. The site of surgery properly noted/marked. The patient was taken to Operating Room # 2, identified as Inna Rondon and the procedure verified as  Delivery. A Time Out was held and the above information confirmed. After induction of anesthesia, the patient was draped and prepped in the usual sterile manner. A Pfannenstiel incision was made and carried down through the subcutaneous tissue to the fascia. Fascial incision was made and extended transversely. The fascia was  from the underlying rectus tissue superiorly and inferiorly. The peritoneum was identified and entered. Peritoneal incision was extended longitudinally. The utero-vesical peritoneal reflection was incised transversely and the bladder flap was bluntly freed from the lower uterine segment. A low transverse uterine incision was made. Delivered from a Vertex presentation was a 3710 gram 8 lb 3 oz MALE  with Apgar scores of 8 at one minute and 9 at five minutes. After the umbilical cord was clamped and cut cord blood was obtained for evaluation. The placenta was removed intact and appeared grossly normal.     The uterus was then exteriorized, the intrauterine cavity cleared of any residual clots or debris, and the uterine incision was closed in 2 layers with running locked sutures of 0 Vicryl. Hemostasis was attained.   The Bladder flap was then re-approximated using 2-0 chromic suture. The uterus was then replaced intra-abdominally. The pelvic gutters were explored, cleared of any clot collection, and the anterior abdominal wall peritoneum was closed with 3-0 chromic suture. The fascia was then reapproximated with running sutures of 0 Vicryl. The subcutaneous tissue was then re-approximated with 3 interrupted sutures of 3-0 plain gut, and the skin was reapproximated with 4-0 Vicryl in a subcuticular fashion. Instrument, sponge, and needle counts were correct prior the abdominal closure and at the conclusion of the case. The skin was then dressed with DermaBond, and the patient left the operating room in stable condition, and was transferred to recovery room. Findings:  Clear Fluid    Estimated Blood Loss:  500ml           Drains: Aldana           Total IV Fluids: 2000 ml           Complications:  none           Condition: infant stable to general nursery and mother stable    Disposition: PACU - hemodynamically stable. Attending Attestation: I performed the procedure.     [place], [address] [place] [address]

## 2021-08-31 NOTE — PROGRESS NOTES
Repeat c/s at 1944, thrombophilia, 39w. Baby boy, apgars 8/9, tactile stim and bulb suction. 3710g, VSS.

## 2021-08-31 NOTE — FLOWSHEET NOTE
Patient up to bathroom with assistance. Aldana removed. Small amount of red lochia on pad and in the toilet. Helen care performed. Patient back to the chair for linen change. No complaints.

## 2021-08-31 NOTE — FLOWSHEET NOTE
Patient admitted to room 307 from labor and delivery. Oriented to room and surroundings. Reviewed papers at bedside. [facility] Infant safety instructions and sleep safe for baby given, patient verbalizes understanding. Informed of visitation policy and that one person 25 or older may stay. Instructed on IS. IV infusing without difficulty. Patient states ok for infant to have the hep B vaccine, patient would like tdap vaccine before discharge.

## 2021-08-31 NOTE — FLOWSHEET NOTE
Patient brought to room for parents. Mother moved back to bed with assistance. Reassessment performed. No complaints.

## 2021-08-31 NOTE — PROGRESS NOTES
Attending C/Section  Post-Partum Progress Note    Post-Op Day #: 1    Patient is a 39 y.o. female with LMP: No LMP recorded. and MARYBEL: Estimated Date of Delivery: 9/6/21. SUBJECTIVE:   Itching on Duramorph. Resolving. Some Blues. OBJECTIVE:    Abdomen:  Abdomen soft, non-tender. BS normal. No masses,  No organomegaly   Incision: clean, dry and intact     ASSESSMENT:   normal postpartum exam and normal post-operative exam      PLAN:     Routine Post-Op Care.      Curtis Saavedra MD, Noe Jeter

## 2021-09-01 VITALS
WEIGHT: 172 LBS | HEIGHT: 61 IN | OXYGEN SATURATION: 96 % | RESPIRATION RATE: 16 BRPM | DIASTOLIC BLOOD PRESSURE: 70 MMHG | HEART RATE: 84 BPM | BODY MASS INDEX: 32.47 KG/M2 | TEMPERATURE: 97.9 F | SYSTOLIC BLOOD PRESSURE: 117 MMHG

## 2021-09-01 PROCEDURE — 6370000000 HC RX 637 (ALT 250 FOR IP): Performed by: OBSTETRICS & GYNECOLOGY

## 2021-09-01 PROCEDURE — 6360000002 HC RX W HCPCS: Performed by: OBSTETRICS & GYNECOLOGY

## 2021-09-01 RX ORDER — IBUPROFEN 600 MG/1
600 TABLET ORAL EVERY 6 HOURS PRN
Qty: 60 TABLET | Refills: 1 | Status: SHIPPED | OUTPATIENT
Start: 2021-09-01 | End: 2022-11-02

## 2021-09-01 RX ORDER — OXYCODONE HYDROCHLORIDE AND ACETAMINOPHEN 5; 325 MG/1; MG/1
1 TABLET ORAL EVERY 6 HOURS PRN
Qty: 20 TABLET | Refills: 0 | Status: SHIPPED | OUTPATIENT
Start: 2021-09-01 | End: 2021-09-06

## 2021-09-01 RX ADMIN — DOCUSATE SODIUM 100 MG: 100 CAPSULE ORAL at 21:39

## 2021-09-01 RX ADMIN — FERROUS SULFATE TAB 325 MG (65 MG ELEMENTAL FE) 325 MG: 325 (65 FE) TAB at 16:56

## 2021-09-01 RX ADMIN — ENOXAPARIN SODIUM 40 MG: 40 INJECTION SUBCUTANEOUS at 21:39

## 2021-09-01 RX ADMIN — METFORMIN HYDROCHLORIDE 1 TABLET: 500 TABLET, EXTENDED RELEASE ORAL at 11:03

## 2021-09-01 RX ADMIN — DOCUSATE SODIUM 100 MG: 100 CAPSULE ORAL at 11:03

## 2021-09-01 RX ADMIN — IBUPROFEN 600 MG: 600 TABLET, FILM COATED ORAL at 11:01

## 2021-09-01 RX ADMIN — IBUPROFEN 600 MG: 600 TABLET, FILM COATED ORAL at 05:07

## 2021-09-01 RX ADMIN — OXYCODONE HYDROCHLORIDE AND ACETAMINOPHEN 1 TABLET: 5; 325 TABLET ORAL at 06:48

## 2021-09-01 RX ADMIN — FERROUS SULFATE TAB 325 MG (65 MG ELEMENTAL FE) 325 MG: 325 (65 FE) TAB at 11:03

## 2021-09-01 RX ADMIN — OXYCODONE HYDROCHLORIDE AND ACETAMINOPHEN 1 TABLET: 5; 325 TABLET ORAL at 15:37

## 2021-09-01 RX ADMIN — IBUPROFEN 600 MG: 600 TABLET, FILM COATED ORAL at 16:56

## 2021-09-01 RX ADMIN — OXYCODONE HYDROCHLORIDE AND ACETAMINOPHEN 1 TABLET: 5; 325 TABLET ORAL at 21:39

## 2021-09-01 RX ADMIN — SERTRALINE HYDROCHLORIDE 50 MG: 50 TABLET ORAL at 21:39

## 2021-09-01 ASSESSMENT — PAIN SCALES - GENERAL
PAINLEVEL_OUTOF10: 3
PAINLEVEL_OUTOF10: 6
PAINLEVEL_OUTOF10: 3
PAINLEVEL_OUTOF10: 6
PAINLEVEL_OUTOF10: 3
PAINLEVEL_OUTOF10: 6

## 2021-09-01 ASSESSMENT — PAIN DESCRIPTION - PROGRESSION: CLINICAL_PROGRESSION: GRADUALLY WORSENING

## 2021-09-01 NOTE — PROGRESS NOTES
Attending C/Section  Post-Partum Progress Note    Post-Op Day #: 2    Patient is a 39 y.o. female with LMP: No LMP recorded. and MARYBEL: Estimated Date of Delivery: 9/6/21. SUBJECTIVE:   No COmplaints    OBJECTIVE:    Abdomen:  Abdomen soft, non-tender. BS normal. No masses,  No organomegaly   Incision: clean, dry and intact     ASSESSMENT:   normal postpartum exam and normal post-operative exam      PLAN:     Routine Post-Op Care. Home on Zoloft, Motrin, Percocet, Lovenox and ASA. Office in 10 days.     Mar Blanchard MD, Swapnil Mcdonnell

## 2021-09-01 NOTE — PLAN OF CARE
Problem: Pain:  Goal: Pain level will decrease  Description: Pain level will decrease  Outcome: Met This Shift     Problem: Fluid Volume - Imbalance:  Goal: Absence of postpartum hemorrhage signs and symptoms  Description: Absence of postpartum hemorrhage signs and symptoms  Outcome: Met This Shift  Goal: Absence of imbalanced fluid volume signs and symptoms  Description: Absence of imbalanced fluid volume signs and symptoms  Outcome: Met This Shift     Problem: Infection - Surgical Site:  Goal: Will show no infection signs and symptoms  Description: Will show no infection signs and symptoms  Outcome: Met This Shift     Problem: Mood - Altered:  Goal: Mood stable  Description: Mood stable  Outcome: Met This Shift     Problem: Nausea/Vomiting:  Goal: Absence of nausea/vomiting  Description: Absence of nausea/vomiting  Outcome: Met This Shift     Problem: Urinary Retention:  Goal: Urinary elimination within specified parameters  Description: Urinary elimination within specified parameters  Outcome: Met This Shift     Problem: Venous Thromboembolism:  Goal: Will show no signs or symptoms of venous thromboembolism  Description: Will show no signs or symptoms of venous thromboembolism  Outcome: Met This Shift  Goal: Absence of signs or symptoms of impaired coagulation  Description: Absence of signs or symptoms of impaired coagulation  Outcome: Met This Shift

## 2021-09-01 NOTE — DISCHARGE SUMMARY
Department of Obstetrics & Gynecology   Section  DISCHARGE SUMMARY    Adam Singh is a 39y.o. year old  female. MARYBEL: Estimated Date of Delivery: 21     Gestational Age: 39w0d    Admitted on: 2021     Admitting Diagnosis: 44 weeks gestation of pregnancy [Z3A.39]    PAST OB HISTORY  OB History        4    Para   2    Term   2            AB   2    Living   1       SAB   2    TAB        Ectopic        Molar        Multiple   0    Live Births   1                Date of Delivery:   Information for the patient's :  Marita Sewell [[id number]]   2021        Delivery Type: Information for the patient's :  Marita Sewell [[id number]]   Delivery Method: , Low Transverse       Indications for  Section: Previous C/Section    Anesthesia: Spinal     Information:   GENDER:   Information for the patient's :  [place] Richelle Brennan [[id number]]   male      BIRTH WEIGHT:   Information for the patient's :  Marita Sewell [[id number]]   Birth Weight: 8 lb 2.9 oz (3.71 kg)      APGARS:    Information for the patient's :  Marita Sewell [[id number]]   APGAR One: 6   ,   Information for the patient's :  Marita Sewell [[id number]]   APGAR Five: 9       Intrapartum complications: None     Post-Partum Hospital Course/Complications: Uneventful    Discharge Date: 21 to Home in Good Condition.     Discharge Medications:    Joy Stewart   Home Medication Instructions [id number]    Printed on:21   Medication Information                      aspirin 81 MG tablet  Take 81 mg by mouth daily             enoxaparin (LOVENOX) 40 MG/0.4ML injection  Inject into the skin daily             ibuprofen (ADVIL;MOTRIN) 600 MG tablet  Take 1 tablet by mouth every 6 hours as needed for Pain             oxyCODONE-acetaminophen (PERCOCET) 5-325 MG per tablet  Take 1 tablet by mouth every 6 hours as

## 2021-10-21 ENCOUNTER — HOSPITAL ENCOUNTER (OUTPATIENT)
Age: 36
Discharge: HOME | End: 2021-10-23

## 2021-10-21 PROCEDURE — 88302 TISSUE EXAM BY PATHOLOGIST: CPT

## 2022-11-02 ENCOUNTER — OFFICE VISIT (OUTPATIENT)
Dept: PRIMARY CARE CLINIC | Age: 37
End: 2022-11-02
Payer: COMMERCIAL

## 2022-11-02 VITALS
SYSTOLIC BLOOD PRESSURE: 126 MMHG | HEIGHT: 61 IN | HEART RATE: 94 BPM | WEIGHT: 133 LBS | TEMPERATURE: 98.6 F | OXYGEN SATURATION: 97 % | BODY MASS INDEX: 25.11 KG/M2 | DIASTOLIC BLOOD PRESSURE: 86 MMHG

## 2022-11-02 DIAGNOSIS — R53.83 OTHER FATIGUE: ICD-10-CM

## 2022-11-02 DIAGNOSIS — K58.0 IRRITABLE BOWEL SYNDROME WITH DIARRHEA: ICD-10-CM

## 2022-11-02 DIAGNOSIS — F41.9 ANXIETY: ICD-10-CM

## 2022-11-02 DIAGNOSIS — R49.9 CHANGE IN VOICE: ICD-10-CM

## 2022-11-02 DIAGNOSIS — R53.83 OTHER FATIGUE: Primary | ICD-10-CM

## 2022-11-02 LAB
ALBUMIN SERPL-MCNC: 4.4 G/DL (ref 3.5–5.2)
ALP BLD-CCNC: 90 U/L (ref 35–104)
ALT SERPL-CCNC: 10 U/L (ref 0–32)
ANION GAP SERPL CALCULATED.3IONS-SCNC: 16 MMOL/L (ref 7–16)
AST SERPL-CCNC: 19 U/L (ref 0–31)
BASOPHILS ABSOLUTE: 0.06 E9/L (ref 0–0.2)
BASOPHILS RELATIVE PERCENT: 0.5 % (ref 0–2)
BILIRUB SERPL-MCNC: 0.8 MG/DL (ref 0–1.2)
BUN BLDV-MCNC: 9 MG/DL (ref 6–20)
CALCIUM SERPL-MCNC: 9.6 MG/DL (ref 8.6–10.2)
CHLORIDE BLD-SCNC: 99 MMOL/L (ref 98–107)
CHOLESTEROL, TOTAL: 195 MG/DL (ref 0–199)
CO2: 23 MMOL/L (ref 22–29)
CREAT SERPL-MCNC: 0.6 MG/DL (ref 0.5–1)
EOSINOPHILS ABSOLUTE: 0.09 E9/L (ref 0.05–0.5)
EOSINOPHILS RELATIVE PERCENT: 0.7 % (ref 0–6)
GFR SERPL CREATININE-BSD FRML MDRD: >60 ML/MIN/1.73
GLUCOSE BLD-MCNC: 84 MG/DL (ref 74–99)
HCT VFR BLD CALC: 46.2 % (ref 34–48)
HDLC SERPL-MCNC: 68 MG/DL
HEMOGLOBIN: 15.7 G/DL (ref 11.5–15.5)
IMMATURE GRANULOCYTES #: 0.04 E9/L
IMMATURE GRANULOCYTES %: 0.3 % (ref 0–5)
LDL CHOLESTEROL CALCULATED: 111 MG/DL (ref 0–99)
LYMPHOCYTES ABSOLUTE: 2.6 E9/L (ref 1.5–4)
LYMPHOCYTES RELATIVE PERCENT: 20.7 % (ref 20–42)
MCH RBC QN AUTO: 33.1 PG (ref 26–35)
MCHC RBC AUTO-ENTMCNC: 34 % (ref 32–34.5)
MCV RBC AUTO: 97.3 FL (ref 80–99.9)
MONOCYTES ABSOLUTE: 0.65 E9/L (ref 0.1–0.95)
MONOCYTES RELATIVE PERCENT: 5.2 % (ref 2–12)
NEUTROPHILS ABSOLUTE: 9.15 E9/L (ref 1.8–7.3)
NEUTROPHILS RELATIVE PERCENT: 72.6 % (ref 43–80)
PDW BLD-RTO: 13.3 FL (ref 11.5–15)
PLATELET # BLD: 274 E9/L (ref 130–450)
PMV BLD AUTO: 10.2 FL (ref 7–12)
POTASSIUM SERPL-SCNC: 4 MMOL/L (ref 3.5–5)
RBC # BLD: 4.75 E12/L (ref 3.5–5.5)
SODIUM BLD-SCNC: 138 MMOL/L (ref 132–146)
T4 TOTAL: 6 MCG/DL (ref 4.5–11.7)
TOTAL PROTEIN: 7.5 G/DL (ref 6.4–8.3)
TRIGL SERPL-MCNC: 82 MG/DL (ref 0–149)
TSH SERPL DL<=0.05 MIU/L-ACNC: 1.2 UIU/ML (ref 0.27–4.2)
VLDLC SERPL CALC-MCNC: 16 MG/DL
WBC # BLD: 12.6 E9/L (ref 4.5–11.5)

## 2022-11-02 PROCEDURE — 99204 OFFICE O/P NEW MOD 45 MIN: CPT | Performed by: FAMILY MEDICINE

## 2022-11-02 RX ORDER — ESCITALOPRAM OXALATE 5 MG/1
5 TABLET ORAL DAILY
Qty: 30 TABLET | Refills: 3
Start: 2022-11-02 | End: 2022-11-22

## 2022-11-02 RX ORDER — ESCITALOPRAM OXALATE 5 MG/1
5 TABLET ORAL DAILY
Qty: 30 TABLET | Refills: 3 | Status: SHIPPED
Start: 2022-11-02 | End: 2022-11-22 | Stop reason: SINTOL

## 2022-11-02 SDOH — ECONOMIC STABILITY: FOOD INSECURITY: WITHIN THE PAST 12 MONTHS, THE FOOD YOU BOUGHT JUST DIDN'T LAST AND YOU DIDN'T HAVE MONEY TO GET MORE.: NEVER TRUE

## 2022-11-02 SDOH — ECONOMIC STABILITY: FOOD INSECURITY: WITHIN THE PAST 12 MONTHS, YOU WORRIED THAT YOUR FOOD WOULD RUN OUT BEFORE YOU GOT MONEY TO BUY MORE.: NEVER TRUE

## 2022-11-02 ASSESSMENT — PATIENT HEALTH QUESTIONNAIRE - PHQ9
2. FEELING DOWN, DEPRESSED OR HOPELESS: 0
SUM OF ALL RESPONSES TO PHQ QUESTIONS 1-9: 0
SUM OF ALL RESPONSES TO PHQ9 QUESTIONS 1 & 2: 0
SUM OF ALL RESPONSES TO PHQ QUESTIONS 1-9: 0
SUM OF ALL RESPONSES TO PHQ QUESTIONS 1-9: 0
1. LITTLE INTEREST OR PLEASURE IN DOING THINGS: 0
SUM OF ALL RESPONSES TO PHQ QUESTIONS 1-9: 0

## 2022-11-02 ASSESSMENT — ENCOUNTER SYMPTOMS
RESPIRATORY NEGATIVE: 1
EYES NEGATIVE: 1
ALLERGIC/IMMUNOLOGIC NEGATIVE: 1
GASTROINTESTINAL NEGATIVE: 1

## 2022-11-02 ASSESSMENT — SOCIAL DETERMINANTS OF HEALTH (SDOH): HOW HARD IS IT FOR YOU TO PAY FOR THE VERY BASICS LIKE FOOD, HOUSING, MEDICAL CARE, AND HEATING?: NOT HARD AT ALL

## 2022-11-02 NOTE — PROGRESS NOTES
22     Jaylene Khan    : 1985 Sex: female   Age: 40 y.o. Chief Complaint   Patient presents with    New Patient     Former pt Dr Suresh Shay    Orders     Would like labs done, hasnt had done in a while       Prior to Admission medications    Medication Sig Start Date End Date Taking? Authorizing Provider   escitalopram (LEXAPRO) 5 MG tablet Take 1 tablet by mouth daily 22  Yes Cali Parekh DO   escitalopram (LEXAPRO) 5 MG tablet Take 1 tablet by mouth daily 22  Yes Irma Glez,           HPI: Patient seen today for follow-up on anxiety irritable bowel syndrome some voice changes since July after COVID infection. She is a smoker which may be a factor as well. We will continue to monitor if persistent recommending ENT evaluation with Dr. Meryle El. Some tailbone discomfort since the birth of her son.  at that time. No known trauma. She also admits to some irritable bowel symptoms since. Plans for probiotic daily baseline labs and then further discussion when she returns. Mild anxiety depression has been present and did recommend Lexapro as an alternative to Zoloft. Instructions use side effects notify me if problems otherwise reassessment 2 weeks. Review of Systems   Constitutional: Negative. HENT: Negative. Eyes: Negative. Respiratory: Negative. Gastrointestinal: Negative. Endocrine: Negative. Genitourinary: Negative. Musculoskeletal: Negative. Skin: Negative. Allergic/Immunologic: Negative. Neurological: Negative. Hematological: Negative. Psychiatric/Behavioral: Negative. Today systems review is overall stable aside from the voiced complaints.       Current Outpatient Medications:     escitalopram (LEXAPRO) 5 MG tablet, Take 1 tablet by mouth daily, Disp: 30 tablet, Rfl: 3    escitalopram (LEXAPRO) 5 MG tablet, Take 1 tablet by mouth daily, Disp: 30 tablet, Rfl: 3    No Known Allergies    Social History Tobacco Use    Smoking status: Every Day     Packs/day: 0.50     Years: 10.00     Pack years: 5.00     Types: Cigarettes    Smokeless tobacco: Never   Vaping Use    Vaping Use: Never used   Substance Use Topics    Alcohol use: No    Drug use: No      Past Surgical History:   Procedure Laterality Date     SECTION N/A 2021     SECTION performed by Ama Garcias MD at [facility] L&D OR     SECTION, LOW TRANSVERSE       Family History   Problem Relation Age of Onset    Asthma Mother     Heart Disease Mother     High Blood Pressure Mother     High Blood Pressure Father      Past Medical History:   Diagnosis Date    Clotting disorder ([facility] [address]     Palpitations        Vitals:    22 1256   BP: 126/86   Pulse: 94   Temp: 98.6 °F (37 °C)   SpO2: 97%   Weight: 133 lb (60.3 kg)   Height: 5' 1\" (1.549 m)     BP Readings from Last 3 Encounters:   22 126/86   21 117/70   21 112/60        Physical Exam  Vitals and nursing note reviewed. Constitutional:       Appearance: She is well-developed. HENT:      Head: Normocephalic. Right Ear: External ear normal.      Left Ear: External ear normal.      Nose: Nose normal.   Eyes:      Conjunctiva/sclera: Conjunctivae normal.      Pupils: Pupils are equal, round, and reactive to light. Cardiovascular:      Rate and Rhythm: Normal rate. Pulmonary:      Breath sounds: Normal breath sounds. Abdominal:      General: Bowel sounds are normal.      Palpations: Abdomen is soft. Musculoskeletal:         General: Normal range of motion. Cervical back: Normal range of motion and neck supple. Skin:     General: Skin is warm and dry. Neurological:      Mental Status: She is alert and oriented to person, place, and time. Psychiatric:         Behavior: Behavior normal.      Today's vitals physical examination stable. Medications reviewed as prescribed. Reassessment with me 2 weeks and sooner if problems.   Lexapro initiated

## 2022-11-22 ENCOUNTER — OFFICE VISIT (OUTPATIENT)
Dept: PRIMARY CARE CLINIC | Age: 37
End: 2022-11-22
Payer: COMMERCIAL

## 2022-11-22 VITALS
WEIGHT: 133 LBS | HEART RATE: 75 BPM | DIASTOLIC BLOOD PRESSURE: 82 MMHG | HEIGHT: 61 IN | SYSTOLIC BLOOD PRESSURE: 126 MMHG | TEMPERATURE: 98.5 F | OXYGEN SATURATION: 97 % | BODY MASS INDEX: 25.11 KG/M2

## 2022-11-22 DIAGNOSIS — R53.83 OTHER FATIGUE: Primary | ICD-10-CM

## 2022-11-22 DIAGNOSIS — J01.10 ACUTE NON-RECURRENT FRONTAL SINUSITIS: ICD-10-CM

## 2022-11-22 DIAGNOSIS — F41.9 ANXIETY: ICD-10-CM

## 2022-11-22 DIAGNOSIS — J04.0 LARYNGITIS: ICD-10-CM

## 2022-11-22 PROCEDURE — 99214 OFFICE O/P EST MOD 30 MIN: CPT | Performed by: FAMILY MEDICINE

## 2022-11-22 RX ORDER — PREDNISONE 1 MG/1
TABLET ORAL
Qty: 30 TABLET | Refills: 0 | Status: SHIPPED | OUTPATIENT
Start: 2022-11-22

## 2022-11-22 RX ORDER — BUPROPION HYDROCHLORIDE 150 MG/1
150 TABLET ORAL EVERY MORNING
Qty: 30 TABLET | Refills: 3 | Status: SHIPPED | OUTPATIENT
Start: 2022-11-22

## 2022-11-22 RX ORDER — AZITHROMYCIN 250 MG/1
TABLET, FILM COATED ORAL
Qty: 1 PACKET | Refills: 0 | Status: SHIPPED | OUTPATIENT
Start: 2022-11-22

## 2022-11-22 ASSESSMENT — ENCOUNTER SYMPTOMS
GASTROINTESTINAL NEGATIVE: 1
ALLERGIC/IMMUNOLOGIC NEGATIVE: 1
EYES NEGATIVE: 1
RESPIRATORY NEGATIVE: 1

## 2022-11-22 NOTE — PROGRESS NOTES
22     Freddy Carlin    : 1985 Sex: female   Age: 40 y.o. Chief Complaint   Patient presents with    Fatigue     Pt states that the fatigue is still the same. Prior to Admission medications    Medication Sig Start Date End Date Taking? Authorizing Provider   predniSONE (DELTASONE) 5 MG tablet 4 tablets daily for 3 days then 3 tablets daily for 3 days then 2 tablets daily for 3 days then 1 tablet daily for 3 days 22  Yes Jenny Parekh DO   azithromycin (ZITHROMAX Z-HAROLDO) 250 MG tablet 2 today  Then 1 qd  4 days 22  Yes Jenny Parekh DO   buPROPion (WELLBUTRIN XL) 150 MG extended release tablet Take 1 tablet by mouth every morning 22  Yes Madai Sabillon DO          HPI: Patient evaluated today to follow-up on fatigue anxiety some laryngitis and chronic sinus drainage. Still with significant throat irritation postnasal drip congestion. Recommended a Z-Haroldo prednisone today and then will follow-up here next month. Recommended possible ENT consultation for chronic laryngitis and this is been over the past 3 to 4 months since COVID infection. Fatigue has been persistent. Anxiety persistent. Lexapro not well-tolerated so discontinued. We discussed use of Wellbutrin and initiated today and then I will reassess with her in the next 4 weeks. Sooner if problems. Review of Systems   Constitutional: Negative. HENT:  Positive for congestion. Eyes: Negative. Respiratory: Negative. Gastrointestinal: Negative. Endocrine: Negative. Genitourinary: Negative. Musculoskeletal: Negative. Skin: Negative. Allergic/Immunologic: Negative. Neurological: Negative. Hematological: Negative. Psychiatric/Behavioral: Negative. Today systems review overall stable aside from the congestion and HPI complaints.       Current Outpatient Medications:     predniSONE (DELTASONE) 5 MG tablet, 4 tablets daily for 3 days then 3 tablets daily for 3 days then 2 tablets daily for 3 days then 1 tablet daily for 3 days, Disp: 30 tablet, Rfl: 0    azithromycin (ZITHROMAX Z-KHRIS) 250 MG tablet, 2 today  Then 1 qd  4 days, Disp: 1 packet, Rfl: 0    buPROPion (WELLBUTRIN XL) 150 MG extended release tablet, Take 1 tablet by mouth every morning, Disp: 30 tablet, Rfl: 3    No Known Allergies    Social History     Tobacco Use    Smoking status: Every Day     Packs/day: 0.50     Years: 10.00     Pack years: 5.00     Types: Cigarettes    Smokeless tobacco: Never   Vaping Use    Vaping Use: Never used   Substance Use Topics    Alcohol use: No    Drug use: No      Past Surgical History:   Procedure Laterality Date     SECTION N/A 2021     SECTION performed by Clark Zuniga MD at [facility] L&D OR     SECTION, LOW TRANSVERSE       Family History   Problem Relation Age of Onset    Asthma Mother     Heart Disease Mother     High Blood Pressure Mother     High Blood Pressure Father      Past Medical History:   Diagnosis Date    Clotting disorder ([address]     Palpitations        Vitals:    22 1628   BP: 126/82   Pulse: 75   Temp: 98.5 °F (36.9 °C)   SpO2: 97%   Weight: 133 lb (60.3 kg)   Height: 5' 1\" (1.549 m)     BP Readings from Last 3 Encounters:   22 126/82   22 126/86   21 117/70        Physical Exam  Vitals and nursing note reviewed. Constitutional:       Appearance: She is well-developed. HENT:      Head: Normocephalic. Right Ear: External ear normal.      Left Ear: External ear normal.      Nose: Nose normal.   Eyes:      Conjunctiva/sclera: Conjunctivae normal.      Pupils: Pupils are equal, round, and reactive to light. Cardiovascular:      Rate and Rhythm: Normal rate. Pulmonary:      Breath sounds: Normal breath sounds. Abdominal:      General: Bowel sounds are normal.      Palpations: Abdomen is soft. Musculoskeletal:         General: Normal range of motion.       Cervical back: Normal range of motion and neck supple. Skin:     General: Skin is warm and dry. Neurological:      Mental Status: She is alert and oriented to person, place, and time. Psychiatric:         Behavior: Behavior normal.      Today's physical exam overall stable. I will sit tight with current meds and care. Follow-up visit 1 month and sooner if problems. Plan Per Assessment:  Ian Dukes was seen today for fatigue. Diagnoses and all orders for this visit:    Other fatigue    Anxiety    Laryngitis  -     Ami - Rafia Cuba DO, Otolaryngology, [place]  -     azithromycin (ZITHROMAX Z-KHRIS) 250 MG tablet; 2 today  Then 1 qd  4 days    Acute non-recurrent frontal sinusitis  -     azithromycin (ZITHROMAX Z-KHRIS) 250 MG tablet; 2 today  Then 1 qd  4 days    Other orders  -     predniSONE (DELTASONE) 5 MG tablet; 4 tablets daily for 3 days then 3 tablets daily for 3 days then 2 tablets daily for 3 days then 1 tablet daily for 3 days  -     buPROPion (WELLBUTRIN XL) 150 MG extended release tablet; Take 1 tablet by mouth every morning          Return in about 4 weeks (around 12/20/2022). Maile Henderson DO    Note was generated with the assistance of voice recognition software. Document was reviewed however may contain grammatical errors.

## 2023-01-17 ENCOUNTER — OFFICE VISIT (OUTPATIENT)
Dept: ENT CLINIC | Age: 38
End: 2023-01-17
Payer: COMMERCIAL

## 2023-01-17 VITALS
TEMPERATURE: 98.1 F | HEART RATE: 74 BPM | OXYGEN SATURATION: 98 % | DIASTOLIC BLOOD PRESSURE: 100 MMHG | SYSTOLIC BLOOD PRESSURE: 137 MMHG | HEIGHT: 61 IN | WEIGHT: 131 LBS | BODY MASS INDEX: 24.73 KG/M2

## 2023-01-17 DIAGNOSIS — R49.0 HOARSENESS: Primary | ICD-10-CM

## 2023-01-17 DIAGNOSIS — J38.3 VOCAL CORDS SWELLING: ICD-10-CM

## 2023-01-17 PROCEDURE — 99204 OFFICE O/P NEW MOD 45 MIN: CPT | Performed by: OTOLARYNGOLOGY

## 2023-01-17 PROCEDURE — 31575 DIAGNOSTIC LARYNGOSCOPY: CPT | Performed by: OTOLARYNGOLOGY

## 2023-01-17 ASSESSMENT — ENCOUNTER SYMPTOMS
RESPIRATORY NEGATIVE: 1
VOICE CHANGE: 1
TROUBLE SWALLOWING: 0
ALLERGIC/IMMUNOLOGIC NEGATIVE: 1
RHINORRHEA: 0
SINUS PRESSURE: 1
GASTROINTESTINAL NEGATIVE: 1
SORE THROAT: 0

## 2023-01-17 NOTE — PROGRESS NOTES
Subjective:     Patient ID:  Diana Khan is a 40 y.o. female. HPI:    Hoarseness  Patient presents with hoarseness. The patient describes severe episodes of hoarseness which are constant over time. The episodes began 6 month(s) ago after she had COVID. Denies any sore throat, dysphagia, weight loss, fatigue, nasal congestion or rhinorrhea. Reports constant post-nasal drainage and throat clearing. Family history of throat cancer in her grandmother and aunt. 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 Received a Z-pack and prednisone from PCP which did not help. Smokes 1/2 PPD for about 18 years. Symptoms of gastroesophageal reflux is not noted. Symptoms of allergic rhinitis are noted including post-nasal drainage.   Tx: None      Trauma/Surgeries in the chest or neck? no  Type: None    Previous prolonged intubation: no           Social History     Tobacco Use   Smoking Status Every Day    Packs/day: 0.50    Years: 10.00    Pack years: 5.00    Types: Cigarettes   Smokeless Tobacco Never     Social History     Socioeconomic History    Marital status:      Spouse name: None    Number of children: None    Years of education: None    Highest education level: None   Tobacco Use    Smoking status: Every Day     Packs/day: 0.50     Years: 10.00     Pack years: 5.00     Types: Cigarettes    Smokeless tobacco: Never   Vaping Use    Vaping Use: Never used   Substance and Sexual Activity    Alcohol use: No    Drug use: No     Social Determinants of Health     Financial Resource Strain: Low Risk     Difficulty of Paying Living Expenses: Not hard at all   Food Insecurity: No Food Insecurity    Worried About Running Out of Food in the Last Year: Never true    Ran Out of Food in the Last Year: Never true           Past Medical History:   Diagnosis Date    Clotting disorder ([address]     Palpitations      Past Surgical History:   Procedure Laterality Date     SECTION N/A 2021  SECTION performed by Og Juarez MD at [facility] L&D OR     SECTION, LOW TRANSVERSE       Family History   Problem Relation Age of Onset    Asthma Mother     Heart Disease Mother     High Blood Pressure Mother     High Blood Pressure Father      Social History     Socioeconomic History    Marital status:      Spouse name: None    Number of children: None    Years of education: None    Highest education level: None   Tobacco Use    Smoking status: Every Day     Packs/day: 0.50     Years: 10.00     Pack years: 5.00     Types: Cigarettes    Smokeless tobacco: Never   Vaping Use    Vaping Use: Never used   Substance and Sexual Activity    Alcohol use: No    Drug use: No     Social Determinants of Health     Financial Resource Strain: Low Risk     Difficulty of Paying Living Expenses: Not hard at all   Food Insecurity: No Food Insecurity    Worried About Running Out of Food in the Last Year: Never true    Ran Out of Food in the Last Year: Never true     No Known Allergies      Review of Systems   Constitutional: Negative. HENT:  Positive for postnasal drip, sinus pressure and voice change. Negative for rhinorrhea, sore throat and trouble swallowing. Respiratory: Negative. Cardiovascular: Negative. Gastrointestinal: Negative. Musculoskeletal: Negative. Allergic/Immunologic: Negative. Neurological: Negative. Hematological: Negative. Objective:   Physical Exam  Constitutional:       Appearance: Normal appearance. She is normal weight. HENT:      Head: Normocephalic and atraumatic. Right Ear: Tympanic membrane, ear canal and external ear normal. No drainage. Left Ear: Tympanic membrane, ear canal and external ear normal. No drainage. Nose: Nose normal. No nasal deformity or septal deviation. Mouth/Throat:      Mouth: Mucous membranes are moist.   Eyes:      General: Lids are normal. Vision grossly intact.       Extraocular Movements: Extraocular movements intact. Conjunctiva/sclera: Conjunctivae normal.      Pupils: Pupils are equal, round, and reactive to light. Cardiovascular:      Rate and Rhythm: Normal rate. Pulmonary:      Effort: Pulmonary effort is normal.   Musculoskeletal:         General: Normal range of motion. Cervical back: Normal range of motion. Lymphadenopathy:      Cervical: Cervical adenopathy (Bilateral lymphadenopathy) present. Skin:     Capillary Refill: Capillary refill takes less than 2 seconds. Neurological:      Mental Status: She is alert. Psychiatric:         Mood and Affect: Mood normal.         Endoscopy Procedure Note    Pre-operative Diagnosis: hoarseness and tobacco abuse    Post-operative Diagnosis:  TVC mass    Indications: Hoarseness, dysphagia or aspiration - not able to be clearly evaluated by indirect laryngoscopy    Anesthesia: Lidocaine 2% and Rajan-Synephrine 1/2%    Endoscopy Type:  nasal endoscopy, nasopharyngoscopy, laryngoscopy    Procedure Details   With the patient sitting upright in the examining chair informed consent was obtained. The left side(s) of the nose was topically anesthetized with spray. After waiting an appropriate period of time for anesthesia/ vasoconstriction to become effective, the 0-degree  flexible laryngoscope was passed through the left side(s) of the nose, and the nose, nasopharynx, oropharynx, hypopharynx and larynx were examined. An identical procedure was performed on the contralateral side. Examination was performed during quiet respiration and with phonation. I was present for the entire procedure. The following findings were noted. Findings:  Mucosa:  without erythema or discharge   Nasal septum:  normal   Turbinates:  normal   Adenoid:  normal   Eustachian tubes:  normal   Mucous stranding:  present   Lesions:  present   Modified Adams's Maneuver not indicated  notindicated   Larynx Subglottis is patent.   Lesion of left true vocal cord noted. Decreased movement of the left TVC. Condition:  Stable    Complications:  None    Pictures:                            Assessment:       Diagnosis Orders   1. Hoarseness  CT SOFT TISSUE NECK W CONTRAST      2. Vocal cords swelling  CT SOFT TISSUE NECK W CONTRAST                Plan:     Plan for CT neck with contrast and DL with biopsy and panendoscopy  Follow up 2 weeks after surgery                 Dae Gomez  1985    I have discussed the case, including pertinent history and exam findings with the resident. I have seen and examined the patient and the key elements of the encounter have been performed by me. I agree with the assessment, plan and orders as documented by the  resident              Remainder of medical problems as per  resident note. Patient seen and examined. Agree with above exam, assessment and plan.       Electronically signed by Sadia Saini DO on 1/23/23 at 2:16 PM EST

## 2023-01-17 NOTE — PATIENT INSTRUCTIONS
Thank you for choosing our [facility] or [facility] practice. We are committed to your medical treatment and  care. If you need to reschedule or cancel your surgery or follow up  appointment, please call the surgery scheduler at [phone]. INSTRUCTIONS FOR SURGERY Panendoscopy w/ biopsy 2/15/23    Nothing to eat or drink after midnight the night before surgery unless surgery is at [facility] or otherwise instructed by the hospital.    DO NOT TAKE ANY ASPIRIN PRODUCTS 7 days prior to surgery-unless required by your cardiologist or primary care physician. Tylenol only. No Advil, Motrin, Aleve, or Ibuprofen    Any illegal drugs in your system (including Marijuana even if legally prescribed) will result in your surgery being cancelled. Please be sure to check with our office or the hospital on time frame for the drugs to be out of your system. Should your insurance change at any time you must contact our office. Failure to do so may result in your surgery being rescheduled. If you need paperwork filled out for work, you must give the office 2 weeks to complete and submit the forms.       [address], [address] [facility] you a couple days prior to your surgery and give you further instructions, if any questions call them at [phone]

## 2023-01-18 ENCOUNTER — TELEPHONE (OUTPATIENT)
Dept: ENT CLINIC | Age: 38
End: 2023-01-18

## 2023-01-18 ENCOUNTER — PREP FOR PROCEDURE (OUTPATIENT)
Dept: ENT CLINIC | Age: 38
End: 2023-01-18

## 2023-01-18 DIAGNOSIS — J38.3 VOCAL CORDS SWELLING: Primary | ICD-10-CM

## 2023-01-18 ASSESSMENT — VISUAL ACUITY: OU: 1

## 2023-01-18 NOTE — TELEPHONE ENCOUNTER
[facility] to authorize order with patient insurance. Patient is scheduled for neck CT with radiology on 1/23/23 @ 2:30pm. Patient has been notified of date and time and that they need to arrive at 2:15pm. Patient was informed she needs to be NPO 3 hours prior to procedure. Patient instructed to park in [place] parking lot and report to registration. Detail voicemail left for patient.     Electronically signed by Florentino Pryor MA on 1/18/23 at 9:00 AM EST

## 2023-01-18 NOTE — TELEPHONE ENCOUNTER
Prior Authorization Form:      DEMOGRAPHICS:                     Patient Name:  Karina Wallace  Patient :  1985            Ins[facility]:  [facility] / Product Type: *No Product type* /   Insurance ID Number:    Payer/Plan Subscr  Sex Relation Sub. Ins. ID Effective Group Num   1.  [address], [address] 1984 Male Spouse [id number]                                    [id number]         DIAGNOSIS & PROCEDURE:                       Procedure/Operation: panendoscopy w/biopsy           CPT Code: 83739,28008    Diagnosis:  vocal cord swelling    ICD10 Code: j38.3    Location:  [facility]    Surgeon:  cecille    SCHEDULING INFORMATION:                          Date: 2/15/23    Time: n/a              Anesthesia:  General                                                       Status:  Outpatient        Special Comments:  include all chart notes       Electronically signed by Jerrica Gramajo MA on 2023 at 2:43 PM

## 2023-01-19 ENCOUNTER — ANESTHESIA EVENT (OUTPATIENT)
Dept: OPERATING ROOM | Age: 38
End: 2023-01-19
Payer: COMMERCIAL

## 2023-01-23 ENCOUNTER — OFFICE VISIT (OUTPATIENT)
Dept: PRIMARY CARE CLINIC | Age: 38
End: 2023-01-23

## 2023-01-23 VITALS
BODY MASS INDEX: 25.68 KG/M2 | SYSTOLIC BLOOD PRESSURE: 110 MMHG | WEIGHT: 136 LBS | OXYGEN SATURATION: 99 % | DIASTOLIC BLOOD PRESSURE: 74 MMHG | HEIGHT: 61 IN | HEART RATE: 96 BPM | TEMPERATURE: 97.4 F

## 2023-01-23 DIAGNOSIS — J04.0 LARYNGITIS: Primary | ICD-10-CM

## 2023-01-23 DIAGNOSIS — F41.9 ANXIETY: ICD-10-CM

## 2023-01-23 DIAGNOSIS — R49.9 CHANGE IN VOICE: ICD-10-CM

## 2023-01-23 DIAGNOSIS — J38.3 OTHER DISEASES OF VOCAL CORDS: ICD-10-CM

## 2023-01-23 RX ORDER — NICOTINE 21 MG/24HR
1 PATCH, TRANSDERMAL 24 HOURS TRANSDERMAL EVERY 24 HOURS
Qty: 30 PATCH | Refills: 1 | Status: SHIPPED | OUTPATIENT
Start: 2023-01-23 | End: 2024-01-23

## 2023-01-23 ASSESSMENT — ENCOUNTER SYMPTOMS
RESPIRATORY NEGATIVE: 1
ALLERGIC/IMMUNOLOGIC NEGATIVE: 1
GASTROINTESTINAL NEGATIVE: 1
EYES NEGATIVE: 1

## 2023-01-23 NOTE — PROGRESS NOTES
Follow-up23     Katherine Navarro    : 1985 Sex: female   Age: 40 y.o. Chief Complaint   Patient presents with    Medication Check     Was put on wellbutrin last visit      Anxiety     No issues with new med         Prior to Admission medications    Medication Sig Start Date End Date Taking? Authorizing Provider   nicotine (NICODERM CQ) 21 MG/24HR Place 1 patch onto the skin every 24 hours 23 Yes Tasha Parekh DO   buPROPion (WELLBUTRIN XL) 150 MG extended release tablet Take 1 tablet by mouth every morning 22  Yes Juan Ramon Bonilla DO          HPI: Berenice Arreaga presents today with wound laryngitis in voice change. She was evaluated by ear nose and throat and vocal cord inflammation is present. Plans are for direct laryngoscope and biopsy February 15. Once completed we will follow-up. Smoking cessation is in progress. Wellbutrin 150 daily will continue. The addition of NicoDerm patch today as well. Review of Systems   Constitutional: Negative. HENT: Negative. Eyes: Negative. Respiratory: Negative. Gastrointestinal: Negative. Endocrine: Negative. Genitourinary: Negative. Musculoskeletal: Negative. Skin: Negative. Allergic/Immunologic: Negative. Neurological: Negative. Hematological: Negative. Psychiatric/Behavioral: Negative. Today systems review is overall stable. Medications as prescribed. Reassessment with me 4 weeks and we will review biopsy at that time.         Current Outpatient Medications:     nicotine (NICODERM CQ) 21 MG/24HR, Place 1 patch onto the skin every 24 hours, Disp: 30 patch, Rfl: 1    buPROPion (WELLBUTRIN XL) 150 MG extended release tablet, Take 1 tablet by mouth every morning, Disp: 30 tablet, Rfl: 3    No Known Allergies    Social History     Tobacco Use    Smoking status: Every Day     Packs/day: 0.50     Years: 10.00     Pack years: 5.00     Types: Cigarettes    Smokeless tobacco: Never   Vaping Use Vaping Use: Never used   Substance Use Topics    Alcohol use: No    Drug use: No      Past Surgical History:   Procedure Laterality Date     SECTION N/A 2021     SECTION performed by Sesar Loja MD at [facility] L&D OR     SECTION, LOW TRANSVERSE       Family History   Problem Relation Age of Onset    Asthma Mother     Heart Disease Mother     High Blood Pressure Mother     High Blood Pressure Father      Past Medical History:   Diagnosis Date    Clotting disorder ([address]     Palpitations        Vitals:    23 1453   BP: 110/74   Pulse: 96   Temp: 97.4 °F (36.3 °C)   SpO2: 99%   Weight: 136 lb (61.7 kg)   Height: 5' 1\" (1.549 m)     BP Readings from Last 3 Encounters:   23 110/74   23 (!) 137/100   22 126/82        Physical Exam  Vitals and nursing note reviewed. Constitutional:       Appearance: She is well-developed. HENT:      Head: Normocephalic. Right Ear: External ear normal.      Left Ear: External ear normal.      Nose: Nose normal.   Eyes:      Conjunctiva/sclera: Conjunctivae normal.      Pupils: Pupils are equal, round, and reactive to light. Cardiovascular:      Rate and Rhythm: Normal rate. Pulmonary:      Breath sounds: Normal breath sounds. Abdominal:      General: Bowel sounds are normal.      Palpations: Abdomen is soft. Musculoskeletal:         General: Normal range of motion. Cervical back: Normal range of motion and neck supple. Skin:     General: Skin is warm and dry. Neurological:      Mental Status: She is alert and oriented to person, place, and time. Psychiatric:         Behavior: Behavior normal.      Today's vitals and physical examination stable. Anxiety present with recent news of vocal cord inflammation. Involves the left vocal cord. Further work-up will take place in February. Follow-up shortly after.   Continue encourage off cigarettes and will maintain Wellbutrin as prescribed with the addition of NicoDerm patch today. Instructed use side effects and notify me if problems. Follow-up visit 4 weeks          Plan Per Assessment:  Lisa Gonzalez was seen today for medication check and anxiety. Diagnoses and all orders for this visit:    Laryngitis    Other diseases of vocal cords    Change in voice    Anxiety    Other orders  -     nicotine (NICODERM CQ) 21 MG/24HR; Place 1 patch onto the skin every 24 hours          Return in about 1 month (around 2/23/2023). Escobar Bey,     Note was generated with the assistance of voice recognition software. Document was reviewed however may contain grammatical errors.

## 2023-01-24 ENCOUNTER — HOSPITAL ENCOUNTER (OUTPATIENT)
Dept: CT IMAGING | Age: 38
Discharge: HOME | End: 2023-01-26
Payer: COMMERCIAL

## 2023-01-24 DIAGNOSIS — J38.3 VOCAL CORDS SWELLING: ICD-10-CM

## 2023-01-24 DIAGNOSIS — R49.0 HOARSENESS: ICD-10-CM

## 2023-01-24 PROCEDURE — 70491 CT SOFT TISSUE NECK W/DYE: CPT

## 2023-01-24 PROCEDURE — 2580000003 HC RX 258: Performed by: RADIOLOGY

## 2023-01-24 PROCEDURE — 6360000004 HC RX CONTRAST MEDICATION: Performed by: RADIOLOGY

## 2023-01-24 RX ORDER — SODIUM CHLORIDE 0.9 % (FLUSH) 0.9 %
10 SYRINGE (ML) INJECTION PRN
Status: DISCONTINUED | OUTPATIENT
Start: 2023-01-24 | End: 2023-01-27 | Stop reason: HOSPADM

## 2023-01-24 RX ADMIN — SODIUM CHLORIDE, PRESERVATIVE FREE 10 ML: 5 INJECTION INTRAVENOUS at 11:12

## 2023-01-24 RX ADMIN — IOPAMIDOL 75 ML: 755 INJECTION, SOLUTION INTRAVENOUS at 11:12

## 2023-01-31 RX ORDER — ASPIRIN 81 MG/1
TABLET ORAL
COMMUNITY
Start: 2021-10-15 | End: 2023-01-31

## 2023-01-31 RX ORDER — HYDROCODONE BITARTRATE AND ACETAMINOPHEN 5; 325 MG/1; MG/1
TABLET ORAL
COMMUNITY
Start: 2021-10-21 | End: 2023-01-31

## 2023-01-31 NOTE — PROGRESS NOTES
[facility] PRE-ADMISSION TESTING INSTRUCTIONS    The Preadmission Testing patient is instructed accordingly using the following criteria (check applicable):    ARRIVAL INSTRUCTIONS:  [x] Parking the day of Surgery is located in the Main Entrance lot. Upon entering the door, make an immediate right to the surgery reception desk    [x] Bring photo ID and insurance card    [] Bring in a copy of Living will or Durable Power of  papers. [x] Please be sure to arrange transportation to and from the hospital    [x] Please arrange for someone to be with you the remainder of the day due to having anesthesia      GENERAL INSTRUCTIONS:    [x] Nothing by mouth after midnight, including gum, candy, mints or water    [x] You may brush your teeth, but do not swallow any water    [x] Take medications as instructed with 1-2 oz of water    [] Stop herbal supplements and vitamins 5 days prior to procedure    [x] Follow preop dosing of blood thinners per physician instructions    [] Do not take insulin or oral diabetic medications    [] If diabetic and have low blood sugar or feel symptomatic, take 1-2oz apple juice or glucose tablets    [] Bring inhalers day of surgery    [] Bring C-PAP/ Bi-Pap day of surgery    [x] Bring urine specimen day of surgery    [x] Antibacterial Soap shower or bath AM of Surgery, no lotion, powders or creams to surgical site    [] Follow bowel prep as instructed per surgeon    [x] No tobacco products within 24 hours of surgery     [x] No alcohol or illegal drug use within 24 hours of surgery.     [x] Jewelry, body piercing's, eyeglasses, contact lenses and dentures are not permitted into surgery (bring cases)      [] Please do not wear any nail polish or make up on the day of surgery    [] If not already done, you can expect a call from registration    [x] If surgeon requests a time change you will be notified the day prior to surgery    [] If you receive a survey after surgery we would greatly appreciate your comments    [] Parent/guardian of a minor must accompany their child and remain on the premises  the entire time they are under our care     [] Pediatric patients may bring favorite toy, blanket or comfort item with them    [] A caregiver or family member must remain with the patient during their stay if they are mentally handicapped, have dementia, disoriented or unable to use a call light or would be a safety concern if left unattended    [x] Please notify surgeon if you develop any illness between now and time of surgery (cold, cough, sore throat, fever, nausea, vomiting) or any signs of infections  including skin, wounds, and dental.    [] Other instructions    EDUCATIONAL MATERIALS PROVIDED:    [] PAT Preoperative Education Packet/Booklet     [] Medication List    [] Fluoroscopy Information Pamphlet    [] Transfusion bracelet applied with instructions    [] Joint replacement video reviewed    [] Shower with antibacterial soap and use CHG wipes provided the evening before surgery as instructed

## 2023-02-01 ENCOUNTER — HOSPITAL ENCOUNTER (OUTPATIENT)
Age: 38
Setting detail: OUTPATIENT SURGERY
Discharge: HOME | End: 2023-02-01
Attending: OTOLARYNGOLOGY | Admitting: OTOLARYNGOLOGY
Payer: COMMERCIAL

## 2023-02-01 VITALS
DIASTOLIC BLOOD PRESSURE: 88 MMHG | TEMPERATURE: 97.2 F | SYSTOLIC BLOOD PRESSURE: 135 MMHG | WEIGHT: 133 LBS | HEART RATE: 81 BPM | BODY MASS INDEX: 25.11 KG/M2 | OXYGEN SATURATION: 100 % | HEIGHT: 61 IN | RESPIRATION RATE: 16 BRPM

## 2023-02-01 DIAGNOSIS — J38.3 OTHER DISEASES OF VOCAL CORDS: ICD-10-CM

## 2023-02-01 DIAGNOSIS — G89.18 POST-OP PAIN: ICD-10-CM

## 2023-02-01 DIAGNOSIS — Z01.818 PRE-OP EXAM: Primary | ICD-10-CM

## 2023-02-01 LAB
HCG, URINE, POC: NEGATIVE
Lab: NORMAL
NEGATIVE QC PASS/FAIL: NORMAL
POSITIVE QC PASS/FAIL: NORMAL

## 2023-02-01 PROCEDURE — 6370000000 HC RX 637 (ALT 250 FOR IP): Performed by: OTOLARYNGOLOGY

## 2023-02-01 PROCEDURE — 2720000010 HC SURG SUPPLY STERILE: Performed by: OTOLARYNGOLOGY

## 2023-02-01 PROCEDURE — 6360000002 HC RX W HCPCS

## 2023-02-01 PROCEDURE — 31536 LARYNGOSCOPY W/BX & OP SCOPE: CPT | Performed by: OTOLARYNGOLOGY

## 2023-02-01 PROCEDURE — 7100000010 HC PHASE II RECOVERY - FIRST 15 MIN: Performed by: OTOLARYNGOLOGY

## 2023-02-01 PROCEDURE — 2580000003 HC RX 258: Performed by: OTOLARYNGOLOGY

## 2023-02-01 PROCEDURE — 2500000003 HC RX 250 WO HCPCS

## 2023-02-01 PROCEDURE — A4216 STERILE WATER/SALINE, 10 ML: HCPCS | Performed by: OTOLARYNGOLOGY

## 2023-02-01 PROCEDURE — 3600000002 HC SURGERY LEVEL 2 BASE: Performed by: OTOLARYNGOLOGY

## 2023-02-01 PROCEDURE — 2709999900 HC NON-CHARGEABLE SUPPLY: Performed by: OTOLARYNGOLOGY

## 2023-02-01 PROCEDURE — 7100000001 HC PACU RECOVERY - ADDTL 15 MIN: Performed by: OTOLARYNGOLOGY

## 2023-02-01 PROCEDURE — 7100000011 HC PHASE II RECOVERY - ADDTL 15 MIN: Performed by: OTOLARYNGOLOGY

## 2023-02-01 PROCEDURE — 3600000012 HC SURGERY LEVEL 2 ADDTL 15MIN: Performed by: OTOLARYNGOLOGY

## 2023-02-01 PROCEDURE — 88305 TISSUE EXAM BY PATHOLOGIST: CPT

## 2023-02-01 PROCEDURE — 7100000000 HC PACU RECOVERY - FIRST 15 MIN: Performed by: OTOLARYNGOLOGY

## 2023-02-01 PROCEDURE — 3700000000 HC ANESTHESIA ATTENDED CARE: Performed by: OTOLARYNGOLOGY

## 2023-02-01 PROCEDURE — 2580000003 HC RX 258

## 2023-02-01 PROCEDURE — 3700000001 HC ADD 15 MINUTES (ANESTHESIA): Performed by: OTOLARYNGOLOGY

## 2023-02-01 RX ORDER — SODIUM CHLORIDE 0.9 % (FLUSH) 0.9 %
5-40 SYRINGE (ML) INJECTION EVERY 12 HOURS SCHEDULED
Status: DISCONTINUED | OUTPATIENT
Start: 2023-02-01 | End: 2023-02-01 | Stop reason: HOSPADM

## 2023-02-01 RX ORDER — SODIUM CHLORIDE 0.9 % (FLUSH) 0.9 %
5-40 SYRINGE (ML) INJECTION PRN
Status: DISCONTINUED | OUTPATIENT
Start: 2023-02-01 | End: 2023-02-01 | Stop reason: HOSPADM

## 2023-02-01 RX ORDER — SODIUM CHLORIDE 9 MG/ML
INJECTION, SOLUTION INTRAVENOUS PRN
Status: DISCONTINUED | OUTPATIENT
Start: 2023-02-01 | End: 2023-02-01 | Stop reason: HOSPADM

## 2023-02-01 RX ORDER — HYDROCODONE BITARTRATE AND ACETAMINOPHEN 5; 325 MG/1; MG/1
1 TABLET ORAL EVERY 4 HOURS PRN
Qty: 12 TABLET | Refills: 0 | Status: SHIPPED | OUTPATIENT
Start: 2023-02-01 | End: 2023-02-08

## 2023-02-01 RX ORDER — ROCURONIUM BROMIDE 10 MG/ML
INJECTION, SOLUTION INTRAVENOUS PRN
Status: DISCONTINUED | OUTPATIENT
Start: 2023-02-01 | End: 2023-02-01 | Stop reason: SDUPTHER

## 2023-02-01 RX ORDER — LIDOCAINE HYDROCHLORIDE 20 MG/ML
INJECTION, SOLUTION EPIDURAL; INFILTRATION; INTRACAUDAL; PERINEURAL PRN
Status: DISCONTINUED | OUTPATIENT
Start: 2023-02-01 | End: 2023-02-01 | Stop reason: SDUPTHER

## 2023-02-01 RX ORDER — EPINEPHRINE NASAL SOLUTION 1 MG/ML
SOLUTION NASAL PRN
Status: DISCONTINUED | OUTPATIENT
Start: 2023-02-01 | End: 2023-02-01 | Stop reason: ALTCHOICE

## 2023-02-01 RX ORDER — KETOROLAC TROMETHAMINE 30 MG/ML
INJECTION, SOLUTION INTRAMUSCULAR; INTRAVENOUS PRN
Status: DISCONTINUED | OUTPATIENT
Start: 2023-02-01 | End: 2023-02-01 | Stop reason: SDUPTHER

## 2023-02-01 RX ORDER — ONDANSETRON 2 MG/ML
INJECTION INTRAMUSCULAR; INTRAVENOUS PRN
Status: DISCONTINUED | OUTPATIENT
Start: 2023-02-01 | End: 2023-02-01 | Stop reason: SDUPTHER

## 2023-02-01 RX ORDER — ONDANSETRON 4 MG/1
4 TABLET, ORALLY DISINTEGRATING ORAL 3 TIMES DAILY PRN
Qty: 12 TABLET | Refills: 0 | Status: SHIPPED | OUTPATIENT
Start: 2023-02-01

## 2023-02-01 RX ORDER — DEXAMETHASONE SODIUM PHOSPHATE 4 MG/ML
INJECTION, SOLUTION INTRA-ARTICULAR; INTRALESIONAL; INTRAMUSCULAR; INTRAVENOUS; SOFT TISSUE PRN
Status: DISCONTINUED | OUTPATIENT
Start: 2023-02-01 | End: 2023-02-01 | Stop reason: SDUPTHER

## 2023-02-01 RX ORDER — PROCHLORPERAZINE EDISYLATE 5 MG/ML
5 INJECTION INTRAMUSCULAR; INTRAVENOUS
Status: DISCONTINUED | OUTPATIENT
Start: 2023-02-01 | End: 2023-02-01 | Stop reason: HOSPADM

## 2023-02-01 RX ORDER — MIDAZOLAM HYDROCHLORIDE 1 MG/ML
INJECTION INTRAMUSCULAR; INTRAVENOUS PRN
Status: DISCONTINUED | OUTPATIENT
Start: 2023-02-01 | End: 2023-02-01 | Stop reason: SDUPTHER

## 2023-02-01 RX ORDER — PREDNISONE 20 MG/1
20 TABLET ORAL 2 TIMES DAILY
Qty: 10 TABLET | Refills: 0 | Status: SHIPPED | OUTPATIENT
Start: 2023-02-01

## 2023-02-01 RX ORDER — FENTANYL CITRATE 50 UG/ML
INJECTION, SOLUTION INTRAMUSCULAR; INTRAVENOUS PRN
Status: DISCONTINUED | OUTPATIENT
Start: 2023-02-01 | End: 2023-02-01 | Stop reason: SDUPTHER

## 2023-02-01 RX ORDER — PROPOFOL 10 MG/ML
INJECTION, EMULSION INTRAVENOUS PRN
Status: DISCONTINUED | OUTPATIENT
Start: 2023-02-01 | End: 2023-02-01 | Stop reason: SDUPTHER

## 2023-02-01 RX ORDER — SODIUM CHLORIDE 9 MG/ML
INJECTION INTRAVENOUS PRN
Status: DISCONTINUED | OUTPATIENT
Start: 2023-02-01 | End: 2023-02-01 | Stop reason: ALTCHOICE

## 2023-02-01 RX ORDER — SODIUM CHLORIDE 9 MG/ML
INJECTION, SOLUTION INTRAVENOUS CONTINUOUS PRN
Status: DISCONTINUED | OUTPATIENT
Start: 2023-02-01 | End: 2023-02-01 | Stop reason: SDUPTHER

## 2023-02-01 RX ADMIN — PROPOFOL 125 MCG/KG/MIN: 10 INJECTION, EMULSION INTRAVENOUS at 11:53

## 2023-02-01 RX ADMIN — FENTANYL CITRATE 50 MCG: 50 INJECTION, SOLUTION INTRAMUSCULAR; INTRAVENOUS at 12:00

## 2023-02-01 RX ADMIN — PROPOFOL 140 MG: 10 INJECTION, EMULSION INTRAVENOUS at 11:50

## 2023-02-01 RX ADMIN — FENTANYL CITRATE 50 MCG: 50 INJECTION, SOLUTION INTRAMUSCULAR; INTRAVENOUS at 11:57

## 2023-02-01 RX ADMIN — MIDAZOLAM 2 MG: 1 INJECTION INTRAMUSCULAR; INTRAVENOUS at 11:36

## 2023-02-01 RX ADMIN — ONDANSETRON 4 MG: 2 INJECTION INTRAMUSCULAR; INTRAVENOUS at 12:07

## 2023-02-01 RX ADMIN — SUGAMMADEX 120 MG: 100 INJECTION, SOLUTION INTRAVENOUS at 12:15

## 2023-02-01 RX ADMIN — DEXAMETHASONE SODIUM PHOSPHATE 10 MG: 4 INJECTION, SOLUTION INTRAMUSCULAR; INTRAVENOUS at 11:55

## 2023-02-01 RX ADMIN — KETOROLAC TROMETHAMINE 30 MG: 30 INJECTION, SOLUTION INTRAMUSCULAR; INTRAVENOUS at 12:15

## 2023-02-01 RX ADMIN — FENTANYL CITRATE 100 MCG: 50 INJECTION, SOLUTION INTRAMUSCULAR; INTRAVENOUS at 11:50

## 2023-02-01 RX ADMIN — ROCURONIUM BROMIDE 30 MG: 10 INJECTION, SOLUTION INTRAVENOUS at 11:50

## 2023-02-01 RX ADMIN — SODIUM CHLORIDE: 9 INJECTION, SOLUTION INTRAVENOUS at 11:00

## 2023-02-01 RX ADMIN — LIDOCAINE HYDROCHLORIDE 60 MG: 20 INJECTION, SOLUTION EPIDURAL; INFILTRATION; INTRACAUDAL; PERINEURAL at 11:50

## 2023-02-01 ASSESSMENT — PAIN - FUNCTIONAL ASSESSMENT: PAIN_FUNCTIONAL_ASSESSMENT: 0-10

## 2023-02-01 ASSESSMENT — PAIN DESCRIPTION - ORIENTATION
ORIENTATION: INNER

## 2023-02-01 ASSESSMENT — PAIN DESCRIPTION - LOCATION
LOCATION: THROAT

## 2023-02-01 ASSESSMENT — LIFESTYLE VARIABLES: SMOKING_STATUS: 1

## 2023-02-01 ASSESSMENT — PAIN DESCRIPTION - PAIN TYPE
TYPE: SURGICAL PAIN
TYPE: SURGICAL PAIN

## 2023-02-01 ASSESSMENT — PAIN SCALES - GENERAL
PAINLEVEL_OUTOF10: 0

## 2023-02-01 NOTE — ANESTHESIA POSTPROCEDURE EVALUATION
Department of Anesthesiology  Postprocedure Note    Patient: Lauren Box  MRN: [id number]  YOB: 1985  Date of evaluation: 2/1/2023      Procedure Summary     Date: 02/01/23 Room / Location: [facility] OR 07 / [facility]    Anesthesia Start: 7888 Anesthesia Stop: 1231    Procedure: DIRECT LARYNGOSCOPY WITH BIOPSY (Bilateral: Throat) Diagnosis:       Other diseases of vocal cords      (Other diseases of vocal cords [J38.3])    Surgeons: Leander Paul DO Responsible Provider: Mata Salazar MD    Anesthesia Type: General ASA Status: 2          Anesthesia Type: General    Lor Phase I: Lor Score: 10    Lor Phase II: Lor Score: 10      Anesthesia Post Evaluation    Patient location during evaluation: PACU  Patient participation: complete - patient participated  Level of consciousness: awake and alert  Pain score: 0  Airway patency: patent  Nausea & Vomiting: no vomiting and no nausea  Complications: no  Cardiovascular status: hemodynamically stable  Respiratory status: spontaneous ventilation  Hydration status: stable

## 2023-02-01 NOTE — DISCHARGE INSTRUCTIONS
Follow up with Dr Janie Roberts in 1 week  [phone]    no voice use for 2day(s)    Soft diet for the first few days, the advance as tolerated

## 2023-02-01 NOTE — PROGRESS NOTES
1300 admitted to stage 2 pacu   here and pt taking po well inner throat dry   1320 discharge instructions reviewed with pt and her  and they verbalized understanding of instructions   1335 discharged into the the care of her  c

## 2023-02-01 NOTE — H&P
Zulma Chand was seen and re-examined preoperatively today, February 1, 2023. There was no substantial change in her physical and medical status. Patient is fit for the proposed surgical procedure. All questions were appropriately addressed and had no further questions regarding the risks, benefits, and alternatives of the procedure. Zulma Chand and family wished to proceed.     Unique Staton DO  Resident Physician  [facility])  Otolaryngology Residency  2/1/2023  10:29 AM

## 2023-02-01 NOTE — H&P
Subjective:      Patient ID:  Keisha Ledesma is a 40 y.o. female. HPI:     Hoarseness  Patient presents with hoarseness. The patient describes severe episodes of hoarseness which are constant over time. The episodes began 6 month(s) ago after she had COVID. Denies any sore throat, dysphagia, weight loss, fatigue, nasal congestion or rhinorrhea. Reports constant post-nasal drainage and throat clearing. Family history of throat cancer in her grandmother and aunt. 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 Received a Z-pack and prednisone from PCP which did not help. Smokes 1/2 PPD for about 18 years. Symptoms of gastroesophageal reflux is not noted. Symptoms of allergic rhinitis are noted including post-nasal drainage.   Tx: None        Trauma/Surgeries in the chest or neck? no  Type: None     Previous prolonged intubation: no              Social History           Tobacco Use   Smoking Status Every Day    Packs/day: 0.50    Years: 10.00    Pack years: 5.00    Types: Cigarettes   Smokeless Tobacco Never      Social History   Social History            Socioeconomic History    Marital status:        Spouse name: None    Number of children: None    Years of education: None    Highest education level: None   Tobacco Use    Smoking status: Every Day       Packs/day: 0.50       Years: 10.00       Pack years: 5.00       Types: Cigarettes    Smokeless tobacco: Never   Vaping Use    Vaping Use: Never used   Substance and Sexual Activity    Alcohol use: No    Drug use: No      Social Determinants of Health          Financial Resource Strain: Low Risk     Difficulty of Paying Living Expenses: Not hard at all   Food Insecurity: No Food Insecurity    Worried About Running Out of Food in the Last Year: Never true    Ran Out of Food in the Last Year: Never true                  Past Medical History        Past Medical History:   Diagnosis Date    Clotting disorder ([facility] [address] Palpitations           Past Surgical History         Past Surgical History:   Procedure Laterality Date     SECTION N/A 2021      SECTION performed by Rosario Mukherjee MD at [facility] L[facility]D OR     SECTION, LOW TRANSVERSE             Family History         Family History   Problem Relation Age of Onset    Asthma Mother      Heart Disease Mother      High Blood Pressure Mother      High Blood Pressure Father           Social History   Social History            Socioeconomic History    Marital status:        Spouse name: None    Number of children: None    Years of education: None    Highest education level: None   Tobacco Use    Smoking status: Every Day       Packs/day: 0.50       Years: 10.00       Pack years: 5.00       Types: Cigarettes    Smokeless tobacco: Never   Vaping Use    Vaping Use: Never used   Substance and Sexual Activity    Alcohol use: No    Drug use: No      Social Determinants of Health          Financial Resource Strain: Low Risk     Difficulty of Paying Living Expenses: Not hard at all   Food Insecurity: No Food Insecurity    Worried About Running Out of Food in the Last Year: Never true    Ran Out of Food in the Last Year: Never true         No Known Allergies        Review of Systems   Constitutional: Negative. HENT:  Positive for postnasal drip, sinus pressure and voice change. Negative for rhinorrhea, sore throat and trouble swallowing. Respiratory: Negative. Cardiovascular: Negative. Gastrointestinal: Negative. Musculoskeletal: Negative. Allergic/Immunologic: Negative. Neurological: Negative. Hematological: Negative. Objective:   Physical Exam  Constitutional:       Appearance: Normal appearance. She is normal weight. HENT:      Head: Normocephalic and atraumatic. Right Ear: Tympanic membrane, ear canal and external ear normal. No drainage.       Left Ear: Tympanic membrane, ear canal and external ear normal. No drainage. Nose: Nose normal. No nasal deformity or septal deviation. Mouth/Throat:      Mouth: Mucous membranes are moist.   Eyes:      General: Lids are normal. Vision grossly intact. Extraocular Movements: Extraocular movements intact. Conjunctiva/sclera: Conjunctivae normal.      Pupils: Pupils are equal, round, and reactive to light. Cardiovascular:      Rate and Rhythm: Normal rate. Pulmonary:      Effort: Pulmonary effort is normal.   Musculoskeletal:         General: Normal range of motion. Cervical back: Normal range of motion. Lymphadenopathy:      Cervical: Cervical adenopathy (Bilateral lymphadenopathy) present. Skin:     Capillary Refill: Capillary refill takes less than 2 seconds. Neurological:      Mental Status: She is alert. Psychiatric:         Mood and Affect: Mood normal.            Endoscopy Procedure Note     Pre-operative Diagnosis: hoarseness and tobacco abuse     Post-operative Diagnosis:  TVC mass     Indications: Hoarseness, dysphagia or aspiration - not able to be clearly evaluated by indirect laryngoscopy     Anesthesia: Lidocaine 2% and Rajan-Synephrine 1/2%     Endoscopy Type:  nasal endoscopy, nasopharyngoscopy, laryngoscopy     Procedure Details   With the patient sitting upright in the examining chair informed consent was obtained. The left side(s) of the nose was topically anesthetized with spray. After waiting an appropriate period of time for anesthesia/ vasoconstriction to become effective, the 0-degree  flexible laryngoscope was passed through the left side(s) of the nose, and the nose, nasopharynx, oropharynx, hypopharynx and larynx were examined. An identical procedure was performed on the contralateral side. Examination was performed during quiet respiration and with phonation. I was present for the entire procedure. The following findings were noted.      Findings:  Mucosa:  without erythema or discharge   Nasal septum:  normal Turbinates:  normal   Adenoid:  normal   Eustachian tubes:  normal   Mucous stranding:  present   Lesions:  present   Modified Adams's Maneuver not indicated  notindicated   Larynx Subglottis is patent. Lesion of left true vocal cord noted. Decreased movement of the left TVC. Condition:  Stable     Complications:  None     Pictures:                          Assessment:        Diagnosis Orders   1. Hoarseness  CT SOFT TISSUE NECK W CONTRAST       2. Vocal cords swelling  CT SOFT TISSUE NECK W CONTRAST                              Plan:      Plan for CT neck with contrast and DL with biopsy and panendoscopy  Follow up 2 weeks after surgery                             Katya Bailey  1985     I have discussed the case, including pertinent history and exam findings with the resident. I have seen and examined the patient and the key elements of the encounter have been performed by me. I agree with the assessment, plan and orders as documented by the  resident                 Remainder of medical problems as per  resident note. Patient seen and examined. Agree with above exam, assessment and plan.         Electronically signed by Ed Villeda DO on 1/23/23 at 2:16 PM EST

## 2023-02-01 NOTE — OP NOTE
2/1/2023  Romi Hayes  [id number]      Pre-operative Diagnosis: vocal cord leukoplakia    Post-operative Diagnosis: same    Procedure: Direct laryngoscopy,  microsuspension with biopsy, and left true vocal cord    Anesthesia: General endotracheal anesthesia    Surgeons/Assistants: Tano/ Robbi Zayas    Estimated Blood Loss: less than 50     Complications: None    Specimens: was Obtained- left true vocal cord     Findings: bilateral reinkes edema, callus over right posterior 1/3 vocal cord, left true vocal cord with nodular leukoplakic changes     Indications for procedure: This is a 41 y/o female with history of hoarseness vocal changes, 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 Risks benefits alteratives discussed including but not limited to bleeding infection damage to adjacent structure need for further procedure change in voice. Pt understood and agreed to proceed. DESCRIPTION OF PROCEDURE: The patient was consented preoperatively, taken   back to the operating room, identified, and appropriately placed in the   supine position. Given anesthesia per general intubation. Once the patient   was intubated, they were turned 90 degrees, and the bed was elevated to my hip   height. A tooth guard was placed in the patient's   oral cavity to protect his upper teeth. Direct laryngoscopy  A Dedo scope was placed in the patient's oral cavity. All areas of oropharynx and hypopharynx were evaluated starting with the right base of the tongue, left base of tongue right vallecula and left vallecula, then proceeding down to the left piriform sinus and left parapharyngeal space. The scope was then moved to the right piriform sinus and parapharyngeal area. The scope was then used to lift the posterior aspect of the epiglottis to evaluate the larynx, bilateral aryepiglottic folds, false vocal cords, anterior commissure, true vocal cords and subglottis. There were abnormalities found.   Using microscissors the epithelial layer was incised, mucosal flap was raised and subepithelial biopsy as well as epithelial biopsy was taken of the left true vocal cord. Biopsies were taken of the left true vocal cord  These were sent off for pathology. Once this was all accomplished,   some adrenaline pledgets were placed on the area where the biopsies were   taken to calm the bleeding down. These were then removed. The Dedo   scope was removed. The patient was then turned back to Anesthesia for appropriate awakening. The patient's oral cavity was examined after the tooth guard was removed and teeth were all intact.

## 2023-02-01 NOTE — ANESTHESIA PRE PROCEDURE
Department of Anesthesiology  Preprocedure Note       Name:  Christianne Hdez   Age:  40 y.o.  :  1985                                          MRN:  [id number]         Date:  2023      Surgeon: Sun Rojo):  Pedro Freedman DO    Procedure: Procedure(s):  PANENDOSCOPY WITH BIOPSY ++PATHOLOGY NOTIFIED++    Medications prior to admission:   Prior to Admission medications    Medication Sig Start Date End Date Taking?  Authorizing Provider   nicotine (NICODERM CQ) 21 MG/24HR Place 1 patch onto the skin every 24 hours 23  Leah Bell DO   buPROPion (WELLBUTRIN XL) 150 MG extended release tablet Take 1 tablet by mouth every morning 22   Leah Bell DO       Current medications:    Current Facility-Administered Medications   Medication Dose Route Frequency Provider Last Rate Last Admin    sodium chloride flush 0.9 % injection 5-40 mL  5-40 mL IntraVENous 2 times per day Ruth Andrade DO        sodium chloride flush 0.9 % injection 5-40 mL  5-40 mL IntraVENous PRN Paul Ortiz,         0.9 % sodium chloride infusion   IntraVENous PRN Ruth Andrade DO           Allergies:  No Known Allergies    Problem List:    Patient Active Problem List   Diagnosis Code    39 weeks gestation of pregnancy Z3A.39    Non-reassuring fetal heart rate or rhythm affecting mother T10.9906    Other fatigue R53.83    Anxiety F41.9    Irritable bowel syndrome with diarrhea K58.0    Change in voice R49.9    Laryngitis J04.0    Acute non-recurrent frontal sinusitis J01.10    Other diseases of vocal cords J38.3       Past Medical History:        Diagnosis Date    Clotting disorder ([address]     DURNING PREGNANCY    Palpitations     NOT RTECENTLY       Past Surgical History:        Procedure Laterality Date     SECTION N/A 2021     SECTION performed by Glenda [facility] at [facility]     SECTION, LOW TRANSVERSE      TUBAL LIGATION         Social History:    Social History     Tobacco Use    Smoking status: Some Days     Packs/day: 0.50     Years: 10.00     Pack years: 5.00     Types: Cigarettes    Smokeless tobacco: Never   Substance Use Topics    Alcohol use: No     Comment: SOCIALLY                                Ready to quit: Not Answered  Counseling given: Not Answered      Vital Signs (Current):   Vitals:    01/31/23 1124 02/01/23 0931 02/01/23 0935   BP:   118/85   Pulse:   85   Resp:   16   Temp:   97.6 °F (36.4 °C)   TempSrc:   Temporal   SpO2:   98%   Weight: 133 lb (60.3 kg) 133 lb (60.3 kg)    Height: 5' 1\" (1.549 m) 5' 1\" (1.549 m)                                               BP Readings from Last 3 Encounters:   02/01/23 118/85   01/23/23 110/74   01/17/23 (!) 137/100       NPO Status: Time of last liquid consumption: 1930                        Time of last solid consumption: 1930                        Date of last liquid consumption: 01/31/23                        Date of last solid food consumption: 01/31/23    BMI:   Wt Readings from Last 3 Encounters:   02/01/23 133 lb (60.3 kg)   01/23/23 136 lb (61.7 kg)   01/17/23 131 lb (59.4 kg)     Body mass index is 25.13 kg/m².     CBC:   Lab Results   Component Value Date/Time    WBC 12.6 11/02/2022 01:36 PM    RBC 4.75 11/02/2022 01:36 PM    HGB 15.7 11/02/2022 01:36 PM    HCT 46.2 11/02/2022 01:36 PM    MCV 97.3 11/02/2022 01:36 PM    RDW 13.3 11/02/2022 01:36 PM     11/02/2022 01:36 PM       CMP:   Lab Results   Component Value Date/Time     11/02/2022 01:36 PM    K 4.0 11/02/2022 01:36 PM    CL 99 11/02/2022 01:36 PM    CO2 23 11/02/2022 01:36 PM    BUN 9 11/02/2022 01:36 PM    CREATININE 0.6 11/02/2022 01:36 PM    GFRAA >60 01/25/2018 03:30 PM    LABGLOM >60 11/02/2022 01:36 PM    GLUCOSE 84 11/02/2022 01:36 PM    PROT 7.5 11/02/2022 01:36 PM    CALCIUM 9.6 11/02/2022 01:36 PM    BILITOT 0.8 11/02/2022 01:36 PM    ALKPHOS 90 11/02/2022 01:36 PM    AST 19 11/02/2022 01:36 PM    ALT 10 11/02/2022 01:36 PM       POC Tests: No results for input(s): POCGLU, POCNA, POCK, POCCL, POCBUN, POCHEMO, POCHCT in the last 72 hours. Coags:   Lab Results   Component Value Date/Time    PROTIME 11.0 08/30/2021 05:00 PM    INR 1.0 08/30/2021 05:00 PM    APTT 27.4 08/30/2021 05:00 PM       HCG (If Applicable):   Lab Results   Component Value Date    PREGTESTUR positive 03/31/2016        ABGs: No results found for: PHART, PO2ART, BOE8OFV, FFC7NVI, BEART, K1YEHKXE     Type & Screen (If Applicable):  No results found for: LABABO, LABRH    Drug/Infectious Status (If Applicable):  No results found for: HIV, HEPCAB    COVID-19 Screening (If Applicable): No results found for: COVID19        Anesthesia Evaluation  Patient summary reviewed and Nursing notes reviewed no history of anesthetic complications:   Airway: Mallampati: II  TM distance: >3 FB   Neck ROM: full  Mouth opening: > = 3 FB   Dental: normal exam         Pulmonary:normal exam    (+) current smoker          Patient did not smoke on day of surgery. Cardiovascular:Negative CV ROS  Exercise tolerance: good (>4 METS),                     Neuro/Psych:   Negative Neuro/Psych ROS              GI/Hepatic/Renal: Neg GI/Hepatic/Renal ROS            Endo/Other: Negative Endo/Other ROS                    Abdominal:             Vascular: negative vascular ROS. Other Findings:           Anesthesia Plan      general     ASA 2       Induction: intravenous. MIPS: Postoperative opioids intended and Prophylactic antiemetics administered. Anesthetic plan and risks discussed with patient and spouse. Plan discussed with CRNA.                     Davis Vincent MD   2/1/2023

## 2023-02-08 ENCOUNTER — OFFICE VISIT (OUTPATIENT)
Dept: ENT CLINIC | Age: 38
End: 2023-02-08

## 2023-02-08 VITALS
DIASTOLIC BLOOD PRESSURE: 93 MMHG | TEMPERATURE: 97.4 F | BODY MASS INDEX: 25.11 KG/M2 | SYSTOLIC BLOOD PRESSURE: 132 MMHG | HEIGHT: 61 IN | OXYGEN SATURATION: 99 % | HEART RATE: 81 BPM | WEIGHT: 133 LBS

## 2023-02-08 DIAGNOSIS — C32.0 VOCAL CORD CANCER (HCC): Primary | ICD-10-CM

## 2023-02-08 PROCEDURE — 99024 POSTOP FOLLOW-UP VISIT: CPT | Performed by: OTOLARYNGOLOGY

## 2023-02-08 ASSESSMENT — ENCOUNTER SYMPTOMS
GASTROINTESTINAL NEGATIVE: 1
VOICE CHANGE: 1
ALLERGIC/IMMUNOLOGIC NEGATIVE: 1
SINUS PRESSURE: 1
RHINORRHEA: 0
RESPIRATORY NEGATIVE: 1
TROUBLE SWALLOWING: 0
SORE THROAT: 0

## 2023-02-08 ASSESSMENT — VISUAL ACUITY: OU: 1

## 2023-02-08 NOTE — PROGRESS NOTES
Subjective:      Patient ID:  Tin Griffiths is a 40 y.o. female. HPI:  here for post op DL surgery 1 week(s) ago. There are not changes since last visit. Past Medical History:   Diagnosis Date    Clotting disorder ([address]     DURNING PREGNANCY    Palpitations     NOT RTECENTLY     Past Surgical History:   Procedure Laterality Date     SECTION N/A 2021     SECTION performed by Sharon Morales MD at [facility] L&D OR     SECTION, LOW TRANSVERSE      LARYNGOSCOPY Bilateral 2023    DIRECT LARYNGOSCOPY WITH BIOPSY performed by Alicia Kirby DO at [facility] OR    TUBAL LIGATION       Family History   Problem Relation Age of Onset    Asthma Mother     Heart Disease Mother     High Blood Pressure Mother     High Blood Pressure Father      Social History     Socioeconomic History    Marital status:      Spouse name: None    Number of children: None    Years of education: None    Highest education level: None   Tobacco Use    Smoking status: Some Days     Packs/day: 0.50     Years: 10.00     Pack years: 5.00     Types: Cigarettes    Smokeless tobacco: Never   Vaping Use    Vaping Use: Never used   Substance and Sexual Activity    Alcohol use: No     Comment: SOCIALLY    Drug use: No     Social Determinants of Health     Financial Resource Strain: Low Risk     Difficulty of Paying Living Expenses: Not hard at all   Food Insecurity: No Food Insecurity    Worried About Running Out of Food in the Last Year: Never true    Ran Out of Food in the Last Year: Never true     No Known Allergies        Review of Systems   Constitutional: Negative. HENT:  Positive for postnasal drip, sinus pressure and voice change. Negative for rhinorrhea, sore throat and trouble swallowing. Respiratory: Negative. Cardiovascular: Negative. Gastrointestinal: Negative. Musculoskeletal: Negative. Allergic/Immunologic: Negative. Neurological: Negative. Hematological: Negative. Objective:   Physical Exam  Constitutional:       Appearance: Normal appearance. She is normal weight. HENT:      Head: Normocephalic and atraumatic. Right Ear: Tympanic membrane, ear canal and external ear normal. No drainage. Left Ear: Tympanic membrane, ear canal and external ear normal. No drainage. Nose: Nose normal. No nasal deformity or septal deviation. Mouth/Throat:      Mouth: Mucous membranes are moist.   Eyes:      General: Lids are normal. Vision grossly intact. Extraocular Movements: Extraocular movements intact. Conjunctiva/sclera: Conjunctivae normal.      Pupils: Pupils are equal, round, and reactive to light. Cardiovascular:      Rate and Rhythm: Normal rate. Pulmonary:      Effort: Pulmonary effort is normal.   Musculoskeletal:         General: Normal range of motion. Cervical back: Normal range of motion. Lymphadenopathy:      Cervical: Cervical adenopathy (Bilateral lymphadenopathy) present. Skin:     Capillary Refill: Capillary refill takes less than 2 seconds. Neurological:      Mental Status: She is alert. Psychiatric:         Mood and Affect: Mood normal.       Path:  Diagnosis:   Left true vocal cord, biopsy: Squamous cell carcinoma, superficially   invasive. Comment:   Intradepartmental consultation is obtained. Assessment:       Diagnosis Orders   1. Vocal cord cancer [facility])                   Plan:      I will order a PET scan today, refer out to [address] for treatment options and get patient set for tumor board.    Follow up in 1 month(s)

## 2023-02-09 ENCOUNTER — TELEPHONE (OUTPATIENT)
Dept: ENT CLINIC | Age: 38
End: 2023-02-09

## 2023-02-09 DIAGNOSIS — C32.0 SQUAMOUS CELL CARCINOMA OF LEFT VOCAL CORD (HCC): Primary | ICD-10-CM

## 2023-02-09 NOTE — TELEPHONE ENCOUNTER
Patient called in today stating that at yesterdays appt. Dr. Rice Speak told her that if she wanted to get a second opinion on diagnosis she could. Patient is requesting that a referral to an ENT within the [facility] be sent to them. Please advise patient if this is possible or not.      Electronically signed by Geovani Lopez on 2/9/2023 at 11:02 AM

## 2023-02-09 NOTE — TELEPHONE ENCOUNTER
Called patient in regards to referral to [facility] patient would like to proceed with Dr. Mando Diaz of treatment with being presented in tumor board, will obtain PET Scan, would also like to possibly get a second opinion to keep options open, advised patient that I would correspond with provider and send referral to specialist in [address] [place] [address] Road.

## 2023-02-10 PROBLEM — C32.0 SQUAMOUS CELL CARCINOMA OF LEFT VOCAL CORD (HCC): Status: ACTIVE | Noted: 2023-02-10

## 2023-02-14 PROBLEM — Z01.818 PRE-OP EXAM: Status: ACTIVE | Noted: 2023-02-14

## 2023-02-15 ENCOUNTER — ANESTHESIA (OUTPATIENT)
Dept: OPERATING ROOM | Age: 38
End: 2023-02-15
Payer: COMMERCIAL

## 2023-02-15 RX ORDER — AZITHROMYCIN 250 MG/1
TABLET, FILM COATED ORAL
Qty: 1 PACKET | Refills: 0 | Status: SHIPPED | OUTPATIENT
Start: 2023-02-15

## 2023-02-21 ENCOUNTER — CASE MANAGEMENT (OUTPATIENT)
Dept: ENT CLINIC | Age: 38
End: 2023-02-21

## 2023-02-21 ENCOUNTER — HOSPITAL ENCOUNTER (OUTPATIENT)
Dept: RADIATION ONCOLOGY | Age: 38
Discharge: HOME | End: 2023-02-21
Payer: COMMERCIAL

## 2023-02-21 VITALS
WEIGHT: 135.7 LBS | BODY MASS INDEX: 25.64 KG/M2 | HEART RATE: 89 BPM | SYSTOLIC BLOOD PRESSURE: 134 MMHG | RESPIRATION RATE: 18 BRPM | DIASTOLIC BLOOD PRESSURE: 88 MMHG | TEMPERATURE: 98.9 F

## 2023-02-21 PROCEDURE — 99205 OFFICE O/P NEW HI 60 MIN: CPT | Performed by: SPECIALIST

## 2023-02-21 PROCEDURE — 99205 OFFICE O/P NEW HI 60 MIN: CPT

## 2023-02-21 NOTE — PROGRESS NOTES
Patient discussed at [facility] multidisciplinary tumor board conference on: 2/21/23  Presenting Physician: Aishwarya Mtz Review Type: Treatment planning    Summary    40y.o. year old with [id number] glottic SCC     Primary Site: Glottis  Histology: Squamous cell carcinoma,superficially   invasive.    National Guidelines Discussed:Yes    Recommendations  Biopsy of right true vocal fold, referral to rad onc for XRT

## 2023-02-21 NOTE — PROGRESS NOTES
Radiation Oncology Consult Note         2023    Ashly Amador  40 y.o.   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 HISTORY OF PRESENT ILLNESS: Ms. Ashly Amador  is a 40y.o. year old female who contracted COVID in the summer 2022. Post COVID she developed hoarseness congestion which was intermittent in nature. However her symptoms progressed in 2022 at which time she sought medical attention with her family physician, Dr Heidy Marroquin. After a course of antibiotics with a Z-Haroldo with no response a return visit resulted in a referral to Dr Joesph Villareal. On 2023 the patient underwent a flexible fiberoptic laryngoscopy which showed a suspicious lesion involving the anterior left true vocal cord. A CT on 2023 again confirmed slight asymmetry and enhancement at the anterior portion of the left true vocal cord. There is also some asymmetry noted in the posterior aspect of the right true vocal cord. On 2022 the patient underwent panendoscopy with biopsy of the left true vocal cord. The pathology yielded a superficially invasive squamous cell carcinoma. The patient now comes to us for consideration of non-operative treatment options.     PAST MEDICAL HISTORY:      Diagnosis Date    Clotting disorder ([address]     DURNING PREGNANCY    Palpitations     NOT RTECENTLY       PAST SURGICAL HISTORY:      Procedure Laterality Date     SECTION N/A 2021     SECTION performed by Elliot Agustin MD at [facility] L&D OR     SECTION, LOW TRANSVERSE      LARYNGOSCOPY Bilateral 2023    DIRECT LARYNGOSCOPY WITH BIOPSY performed by Samina Khanna DO at [address] [facility] No Known Allergies    MEDICATIONS:  Medications reviewed and reconciled. Current Outpatient Medications   Medication Sig Dispense Refill    azithromycin (ZITHROMAX Z-KHRIS) 250 MG tablet 2 today  Then 1 qd  4 days 1 packet 0    predniSONE (DELTASONE) 20 MG tablet Take 1 tablet by mouth 2 times daily (Patient not taking: Reported on 2/8/2023) 10 tablet 0    ondansetron (ZOFRAN-ODT) 4 MG disintegrating tablet Place 1 tablet under the tongue 3 times daily as needed for Nausea or Vomiting 12 tablet 0    nicotine (NICODERM CQ) 21 MG/24HR Place 1 patch onto the skin every 24 hours 30 patch 1    buPROPion (WELLBUTRIN XL) 150 MG extended release tablet Take 1 tablet by mouth every morning 30 tablet 3     No current facility-administered medications for this encounter. FAMILY HISTORY:  Family History   Problem Relation Age of Onset    Asthma Mother     Heart Disease Mother     High Blood Pressure Mother     High Blood Pressure Father        SOCIAL HISTORY:       reports that she has been smoking cigarettes. She has a 5.00 pack-year smoking history. She has never used smokeless tobacco..   reports no history of alcohol use. .   reports no history of drug use. REVIEW OF SYSTEMS:  Obtained from the patient, chart review and nursing assessment.   General ROS: positive for  - fatigue  Psychological ROS: positive for - anxiety  Ophthalmic ROS: negative  ENT ROS: positive for - nasal congestion, sore throat, and vocal changes  negative for - epistaxis, headaches, oral lesions, tinnitus, vertigo, or visual changes  Allergy and Immunology ROS: negative  Hematological and Lymphatic ROS: negative  Endocrine ROS: negative  Breast ROS: negative for breast lumps  Respiratory ROS: no cough, shortness of breath, or wheezing  Cardiovascular ROS: no chest pain or dyspnea on exertion  Gastrointestinal ROS: no abdominal pain, change in bowel habits, or black or bloody stools  Genito-Urinary ROS: no dysuria, trouble voiding, or hematuria  Musculoskeletal ROS: negative  Neurological ROS: no TIA or stroke symptoms  Dermatological ROS: negative    PHYSICAL EXAMINATION:      There were no vitals filed for this visit. Wt Readings from Last 3 Encounters:   02/08/23 133 lb (60.3 kg)   02/01/23 133 lb (60.3 kg)   01/23/23 136 lb (61.7 kg)       KARNOSKY PERFORMANCE STATUS:      Constitutional: A well developed, well nourished 40 y.o. female who is alert, oriented, cooperative and in no apparent distress. EENT: EOMI MATTHEW. No icterus. No conjunctival injections. External canals are patent and no discharge was appreciated. Examination of the oral cavity including bimanual palpation of the gingival buccal surfaces floor mouth base of tongue is unremarkable. Indirect exam was nonproductive. Neck: Supple without thyromegaly or cervical lymphadenopathy. Respiratory:  Breath sounds bilaterally were clear to auscultation. No wheezes, rhonchi or rales. Breasts: Deferred    Cardiovascular: Regular without murmur. Abdomen: Obese, rounded and soft without organomegaly. No rebound, guarding or  rigidity. Lymphatic: No lymphadenopathy. Musculoskeletal: Ambulates without assistance. No gross muscle weakness. Muscle size, tone and strength are normal. No involuntary movements. Extremities:  No lower extremity edema, ulcerations, tenderness, varicosities or erythema. Coordination appears adequate. Sensory function appears intact. Skin:  Warm and dry. Examination of color, turgor and pigmentation was relatively normal. No bruises or skin rashes. Neurological/Psychiatric: General behavior, level of consciousness, thought content is normal. The patient's emotional status is normal.  Cranial nerves II-XII are grossly intact.         RADIATION SAFETY AND ONCOLOGIC TREATMENT SUPPORT:    - Previous radiation history:  No  - History of autoimmune or connective tissue disease:  No  - Nutritional support/ PEG:  Not applicable  - Dental evaluation:  Not applicable  -  requested:  Not asked for.  - Oncology Nurse navigator requested:  - Transportation for daily treatment:  Self    TUMOR MARKERS: No results found for: PSA, CEA, , TF1462,     IMAGING REVIEWED:  1/24/2023 CT NECK  Impression   1. Slight nonspecific fullness of the left false cord as noted above. 2. Otherwise, unremarkable CT of the neck. No mass or lymphadenopathy seen. PATHOLOGY REVIEWED:  2/1/2023 Left TVC biopsy  Diagnosis:   Left true vocal cord, biopsy: Squamous cell carcinoma, superficially   invasive. Comment:   Intradepartmental consultation is obtained. 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 In our encounter today which lasted 65 minutes I discussed the etiology of squamous cell carcinoma of the glottis and the typical management of these tumors as summarized below. It is clear from the discussion of her case during tumor board this morning that there is a asymmetry involving the posterior segment of her right true vocal cord. As a result the recommendation was for the completion of her PET/CT which will happen tomorrow and reviewed biopsy of her right true vocal cord. As summarized by the evidence below, she is a candidate for nonoperative approach and I have discussed with her the potential acute as well as long-term side effects of radiation therapy. Radiation treatment has been associated with local control rates from 80%-95% . [ Int J Radiat Oncol Biol Phys. 1990;19(5):0755-9893], and shorter overall treatment times seemed to yield superior results [ Bi Montemayor. 1997;39(1):115-126, Int J Radiat Oncol Biol Phys. 1996;34(4):823-831].  A prospective randomized study comparing 2 Gy fractions with 2.25 Gy fractions (with 60 Gy in  30 fractions vs 56.25 Gy in 25 fractions for lesions involving less than two-thirds of the vocal cord and, for larger  lesions, 66 Gy in 33 fractions vs 63 Gy in 28 fractions) showed a significant advantage in local control rate for the  shorter treatment time. Five-year local control rate was 77% for the 2 Gy arm and 92% for the 2.25 Gy arm (P=0.004) with no significant difference in survival, acute mucosal reaction, skin reaction, or late effects [Int J Radiat Oncol Biol Phys. 2006;64(1):77-82]. Recent results from a single-institution series including some 325 patients with stage I glottic cancer treated with opposed lateral fields demonstrated 10-year local control rates of 93% for T1a and 91% for T1b. Ultimate local  control rates (including successful salvage after local recurrence) were 98% and 95% for T1a and T1b lesions,  respectively [Int J Radiat Oncol Biol Phys. 2010;78(2):461-466]. Voice quality after treatment of glottic cancer with cordectomy and with radiation has been assessed both subjectively and objectively. Because patients with T1 glottic cancer usually have an abnormal voice, it commonly improves after treatment. While patient satisfaction and subjective evaluation of voice quality are often high after either surgery or radiation, more formal studies demonstrate changes associated with tumor size and  extent of resection [Head Neck. 2007;29(11):1010-101]. NCCN guidelines, version 2020 is used to help review treatment recommendations. Findings of her PET/CT my plan is to radiate to the glottis to 63 Gray in 2.25 Gray per fraction over 28 fractions. She is fully aware that she will require surveillance and there after. She is also in the process of quitting smoking. Procedures and process involved with CT simulation and daily radiation treatments were explained. Toxicities, both expected and less common, were reviewed in detail. Questions were answered to their apparent satisfaction. Thank you for the opportunity to participate in multidisciplinary management of this remarkable and pleasant patient. Dominic Arevalo MD, [address], Reggy Lesch, [facility]    Department of Radiation Oncology  [place] [address] ) [facility]: [phone] (QLT: [phone])  [address] [place] [address] Trinh Ojedaking) [facility]: [phone] (NMV: [phone])  [facility] Billye Katherine) [facility]:  [phone] (ECD:  [phone])    NOTE: This report was transcribed using voice recognition software. Every effort was made to ensure accuracy; however, inadvertent computerized transcription errors may be present.

## 2023-02-21 NOTE — PROGRESS NOTES
Wandy Wong  1985 40 y.o. Referring Physician: Dr. Andrei Lofton    PCP: Adeline Rondon DO     Vitals:    23 1502   BP: 134/88   Pulse: 89   Resp: 18   Temp: 98.9 °F (37.2 °C)        Wt Readings from Last 3 Encounters:   23 135 lb 11.2 oz (61.6 kg)   23 133 lb (60.3 kg)   23 133 lb (60.3 kg)        Body mass index is 25.64 kg/m². Chief Complaint: No chief complaint on file. Cancer Staging  No matching staging information was found for the patient. Prior Radiation Therapy? NO    Concurrent Chemo/radiation? NO    Prior Chemotherapy? NO    Prior Hormonal Therapy? NO    Head and Neck Cancer? No, patient does NOT have HN cancer. LMP: current    Age at first Menses: 15    : 2    Para: 2        Current Outpatient Medications   Medication Sig Dispense Refill    azithromycin (ZITHROMAX Z-KHRIS) 250 MG tablet 2 today  Then 1 qd  4 days 1 packet 0    predniSONE (DELTASONE) 20 MG tablet Take 1 tablet by mouth 2 times daily (Patient not taking: Reported on 2023) 10 tablet 0    ondansetron (ZOFRAN-ODT) 4 MG disintegrating tablet Place 1 tablet under the tongue 3 times daily as needed for Nausea or Vomiting 12 tablet 0    nicotine (NICODERM CQ) 21 MG/24HR Place 1 patch onto the skin every 24 hours 30 patch 1    buPROPion (WELLBUTRIN XL) 150 MG extended release tablet Take 1 tablet by mouth every morning 30 tablet 3     No current facility-administered medications for this encounter.        Past Medical History:   Diagnosis Date    Clotting disorder ([address]     DURNING PREGNANCY    Palpitations     NOT RTECENTLY       Past Surgical History:   Procedure Laterality Date     SECTION N/A 2021     SECTION performed by Pricilla Zhang MD at [facility] L&D OR     SECTION, LOW TRANSVERSE      LARYNGOSCOPY Bilateral 2023    DIRECT LARYNGOSCOPY WITH BIOPSY performed by Rosaura Salas DO at [facility] History   Problem Relation Age of Onset    Cancer Mother     Asthma Mother     Heart Disease Mother     High Blood Pressure Mother     High Blood Pressure Father     Cancer Maternal Aunt     Cancer Maternal Uncle        Social History     Socioeconomic History    Marital status:      Spouse name: Not on file    Number of children: 2    Years of education: Not on file    Highest education level: Not on file   Occupational History    Not on file   Tobacco Use    Smoking status: Some Days     Packs/day: 0.50     Years: 10.00     Pack years: 5.00     Types: Cigarettes    Smokeless tobacco: Never   Vaping Use    Vaping Use: Never used   Substance and Sexual Activity    Alcohol use: No     Comment: SOCIALLY    Drug use: No    Sexual activity: Not on file   Other Topics Concern    Not on file   Social History Narrative    Not on file     Social Determinants of Health     Financial Resource Strain: Low Risk     Difficulty of Paying Living Expenses: Not hard at all   Food Insecurity: No Food Insecurity    Worried About Running Out of Food in the Last Year: Never true    Ran Out of Food in the Last Year: Never true   Transportation Needs: Not on file   Physical Activity: Not on file   Stress: Not on file   Social Connections: Not on file   Intimate Partner Violence: Not on file   Housing Stability: Not on file           Occupation: CRNA  Retired:  NO        REVIEW OF SYSTEMS: <<For Level 5, 10 or more systems>> approximately >20mins spent with patient about radiation to the head and neck utilizing handouts and slides. She presented in July after having Covid and she was having hoarseness that did not seem to improve. She went to her PCP, Dr. Monica Reilly  in Nov. 2022 and he treated her but was not getting any better, she then went to see Dr. Jordy Gonzalez. She had a CT soft neck 1/24/2023 that presented slight non specific fullness of left false cord.   She had a biopsy of the left vocal cord on 2/1/2023 that presented [facility] superficially invasive. She is having her PET scan done 2/22/2023 and after the tumor board on 2/21/2023 she will have her right vocal cord biopsied and then to start RT. She is getting dental clearance per her dentist and she did sign consent. All questions were answerd from a nursing perspective and she expressed understanding of care. Pacemaker/Defibulator/ICD:  No    Mediport: No        FALLS RISK SCREENING ASSESSMENT    Instructions:  Assess the patient and enter the appropriate indicators that are present for fall risk identification. Total the numbers entered and assign a fall risk score from Table 2.  Reassess patient at a minimum every 12 weeks or with status change. Assessment   Date  2/21/2023     1. Mental Ability: confusion/cognitively impaired No - 0       2. Elimination Issues: incontinence, frequency No - 0       3. Ambulatory: use of assistive devices (walker, cane, off-loading devices), attached to equipment (IV pole, oxygen) No - 0     4. Sensory Limitations: dizziness, vertigo, impaired vision No - 0       5. Age Less than 65 years - 0       6. Medication: diuretics, strong analgesics, hypnotics, sedatives, antihypertensive agents   No - 0   7. Falls:  recent history of falls within the last 3 months (not to include slipping or tripping)   No - 0   TOTAL 0    If score of 4 or greater was education given? No       TABLE 2   Risk Score Risk Level Plan of Care   0-3 Little or  No Risk 1. Provide assistance as indicated for ambulation activities  2. Reorient confused/cognitively impaired patient  3. Call-light/bell within patient's reach  4. Chair/bed in low position, stretcher/bed with siderails up except when performing patient care activities  5. Educate patient/family/caregiver on falls prevention  6.  Reassess in 12 weeks or with any noted change in patient condition which places them at a risk for a fall   4-6 Moderate Risk 1.   Provide assistance as indicated for ambulation activities  2. Reorient confused/cognitively impaired patient  3. Call-light/bell within patient's reach  4. Chair/bed in low position, stretcher/bed with siderails up except when performing patient care activities  5. Educate patient/family/caregiver on falls prevention  6. Falls risk precaution (Yellow sticker Level II) placed on patient chart   7 or   Higher High Risk 1. Place patient in easily observable treatment room  2. Patient attended at all times by family member or staff  3. Provide assistance as indicated for ambulation activities  4. Reorient confused/cognitively impaired patient  5. Call-light/bell within patient's reach  6. Chair/bed in low position, stretcher/bed with siderails up except when performing patient care activities  7. Educate patient/family/caregiver on falls prevention  8. Falls risk precaution (Yellow sticker Level III) placed on patient chart           MALNUTRITION RISK SCREENING ASSESSMENT    Instructions:  Assess the patient and enter the appropriate indicators that are present for nutrition risk identification. Total the numbers entered and assign a risk score. Follow the appropriate action for total score listed below. Assessment   Date  2/21/2023     Have you lost weight without trying? 0- No     Have you been eating poorly because of a decreased appetite? 0- No   3. Do you have a diagnosis of head and neck cancer? 2-yes                                                                                    TOTAL 2          Score of 0-1: No action  Score 2 or greater:   For Non-Diabetic Patient: Recommend adding Ensure Complete 2 x daily and provide patient with Ensure wellness bag with coupons  For Diabetic Patient: Recommend adding Glucerna Shake 2 x daily and provide patient with Glucerna Wellness bag with coupons  Route to the dietitian via [address]    Are you having difficulty performing daily routine tasks due to fatigue or weakness (ie: bathing/showering, dressing, housework, meal prep, work, , etc): No     Do you have any arm flexibility/ROM restrictions, swelling or pain that limit activity: No     Any changes in memory, attention/focus that impact daily activities: No     Do you avoid participation in leisure/social activity due to weakness, fatigue or pain: No     ARE ANY OF THE ABOVE ARE ANSWERED YES: No          PT ASSESSMENT FOR REFERRAL    Have you had any recent falls in the past 2 months: No     Do you have difficulty going up/down stairs: No     Are you having difficulty walking: No     Do you often hold onto furniture/environmental supports or feel off balance when you are walking: No     Do you need to take rest breaks when you are walking: No     Any pain on a scale of 1-10 that limits your mobility: No 0/10    ARE ANY OF THE ABOVE ARE ANSWERED YES: No           LYMPHEDEMA SCREENING ASSESSMENT FOR PATIENTS WITH BREAST CANCER    The patient reports the following signs/symptoms of lymphedema: None    Please ask the provider to assess patient for lymphedema for any reported signs or symptoms so a referral to Lymphedema Therapy can be considered. PELVIC FLOOR DYSFUNCTION SCREENING ASSESSMENT    - Do you have any pelvic pain? No     - Do you have any fecal constipation or fecal incontinence? No     - Do you have any urinary incontinence or difficulty starting to urinate? No     ARE ANY OF THE ABOVE ARE ANSWERED YES: No          PREHAB AUDIOLOGY REFERRAL    - Is patient planned to receive Cisplatin? No. This patient is not planned to start Cisplatin. - Is patient planned to receive radiation therapy that may be directed toward auditory canals or nerves? No. Patient is not planned to start radiation therapy to auditory canals or nerves. - Is patient complaining of new onset hearing loss? No. Patient is not complaining of new onset hearing loss.           Patient education given on radiation therapy to the head and neck. The patient expresses understanding and acceptance of instructions.  Josemanuel Baron RN 2/21/2023 3:06 PM           Josemanuel Baron RN

## 2023-02-22 ENCOUNTER — HOSPITAL ENCOUNTER (OUTPATIENT)
Dept: PET IMAGING | Age: 38
Discharge: HOME | End: 2023-02-24
Payer: COMMERCIAL

## 2023-02-22 ENCOUNTER — TELEPHONE (OUTPATIENT)
Dept: CASE MANAGEMENT | Age: 38
End: 2023-02-22

## 2023-02-22 DIAGNOSIS — C32.0 VOCAL CORD CANCER (HCC): ICD-10-CM

## 2023-02-22 LAB — METER GLUCOSE: 88 MG/DL (ref 74–99)

## 2023-02-22 PROCEDURE — 82962 GLUCOSE BLOOD TEST: CPT

## 2023-02-22 PROCEDURE — 78815 PET IMAGE W/CT SKULL-THIGH: CPT

## 2023-02-22 PROCEDURE — 3430000000 HC RX DIAGNOSTIC RADIOPHARMACEUTICAL: Performed by: RADIOLOGY

## 2023-02-22 PROCEDURE — A9552 F18 FDG: HCPCS | Performed by: RADIOLOGY

## 2023-02-22 RX ORDER — FLUDEOXYGLUCOSE F 18 200 MCI/ML
15 INJECTION, SOLUTION INTRAVENOUS
Status: COMPLETED | OUTPATIENT
Start: 2023-02-22 | End: 2023-02-22

## 2023-02-22 RX ADMIN — FLUDEOXYGLUCOSE F 18 15 MILLICURIE: 200 INJECTION, SOLUTION INTRAVENOUS at 07:54

## 2023-02-22 NOTE — TELEPHONE ENCOUNTER
Met with patient after her initial consultation with Dr Marian Gamino for her Head and Neck cancer diagnosis. Introduced myself and explained my role with patients receiving treatment at our center. Patient was friendly and receptive. Next steps include biopsy, Dental Clearance, PET scan, CT simulation and Radiation planning and treatments per Dr Patel's recommendations and follow up care. Provided patient with literature  on ACS Radiation Therapy Side Effects, Radiation Therapy and Your Mouth Booklet, Dental Recommendations handout, [facility], [facility] [address] and [facility] Transportation Resource List. Reviewed resources available including Social Work and Dietician. Provided with my contact information and encouraged patient to call me with questions or concerns. Verbalizes understanding. Patient appreciative of visit. Will continue to follow.

## 2023-02-23 ENCOUNTER — TELEPHONE (OUTPATIENT)
Dept: RADIATION ONCOLOGY | Age: 38
End: 2023-02-23

## 2023-02-23 NOTE — TELEPHONE ENCOUNTER
Rad onc nurse called and left a message with patient to discuss with Dr. Tabitha Novoa her PET scan.

## 2023-02-24 ENCOUNTER — TELEPHONE (OUTPATIENT)
Dept: ENT CLINIC | Age: 38
End: 2023-02-24

## 2023-02-24 NOTE — TELEPHONE ENCOUNTER
Prior Authorization Form:      DEMOGRAPHICS:                     Patient Name:  Tunde Cross  Patient :  1985            Insurance:  Payor: Hubert Moreno / Plan: Hubert Moreno - [place] PPO / Product Type: *No Product type* /   Insurance ID Number:    Payer/Plan Subscr  Sex Relation Sub. Ins. ID Effective Group Num   1.  [address] [facility], [facility] [address] 1984 Male Spouse [id number]                                    [id number]         DIAGNOSIS & PROCEDURE:                       Procedure/Operation: Direct Laryngoscopy w/ Biopsy            CPT Code: 33566    Diagnosis:  vocal cord cancer    ICD10 Code: c32.0    Location:  [facility]    Surgeon:  cecille    SCHEDULING INFORMATION:                          Date: 23    Time: n/a              Anesthesia:  General                                                       Status:  Outpatient        Special Comments:  include all chart notes        Electronically signed by Darien Smith MA on 2023 at 9:51 AM

## 2023-02-27 ENCOUNTER — TELEPHONE (OUTPATIENT)
Dept: RADIATION ONCOLOGY | Age: 38
End: 2023-02-27

## 2023-02-27 NOTE — TELEPHONE ENCOUNTER
Contacted pt per referral for new H&N CA dx pending planning for radiation therapy. Pt denies any nutrition concerns at this time, no questions regarding upcoming nutrition needs during treatment. Briefly discussed role of oncology dietitian. Encouraged to contact this clinician as needed.   Electronically signed by Kin Prince MS, RD, LD on 2/27/2023 at 3:42 PM

## 2023-02-27 NOTE — TELEPHONE ENCOUNTER
Rad onc nurse had long discussion about dental clearance and she is getting rescheduled for her biopsy. She is not feeling well and will keep me posted on her clearance. Plan to sim by the end of the week.

## 2023-02-28 NOTE — PROGRESS NOTES
[facility] PRE-ADMISSION TESTING INSTRUCTIONS    The Preadmission Testing patient is instructed accordingly using the following criteria (check applicable):    ARRIVAL INSTRUCTIONS:  [x] Parking the day of Surgery is located in the Main Entrance lot. Upon entering the door, make an immediate right to the surgery reception desk    [x] Bring photo ID and insurance card    [] Bring in a copy of Living will or Durable Power of  papers. [x] Please be sure to arrange for responsible adult to provide transportation to and from the hospital    [x] Please arrange for responsible adult to be with you for the 24 hour period post procedure due to having anesthesia    [x] If you awake am of surgery not feeling well or have temperature >100 please call [phone]    GENERAL INSTRUCTIONS:    [x] Nothing by mouth after midnight, including gum, candy, mints or water    [x] You may brush your teeth, but do not swallow any water    [] Take medications as instructed with 1-2 oz of water    [] Stop herbal supplements and vitamins 5 days prior to procedure    [] Follow preop dosing of blood thinners per physician instructions    [] Take 1/2 dose of evening insulin, but no insulin after midnight    [] No oral diabetic medications after midnight    [] If diabetic and have low blood sugar or feel symptomatic, take 1-2oz apple juice only    [] Bring inhalers day of surgery    [] Bring C-PAP/ Bi-Pap day of surgery    [x] Bring urine specimen day of surgery    [x] Shower or bath with soap, lather and rinse well, AM of Surgery, no lotion, powders or creams     [] Follow bowel prep as instructed per surgeon    [x] No tobacco products within 24 hours of surgery     [x] No alcohol or illegal drug use within 24 hours of surgery.     [x] Jewelry, body piercing's, eyeglasses, contact lenses and dentures are not permitted into surgery (bring cases)      [x] Please do not wear any nail polish, make up or hair products on the day of surgery    [x] You can expect a call the business day prior to procedure to notify you if your arrival time changes    [x] If you receive a survey after surgery we would greatly appreciate your comments    [] Parent/guardian of a minor must accompany their child and remain on the premises  the entire time they are under our care     [] Pediatric patients may bring favorite toy, blanket or comfort item with them    [] A caregiver or family member must remain with the patient during their stay if they are mentally handicapped, have dementia, disoriented or unable to use a call light or would be a safety concern if left unattended    [x] Please notify surgeon if you develop any illness between now and time of surgery (cold, cough, sore throat, fever, nausea, vomiting) or any signs of infections  including skin, wounds, and dental.    [x]  The Outpatient Pharmacy is available to fill your prescription here on your day of surgery, ask your preop nurse for details    [x] Other instructions: wear loose, comfortable cloting    EDUCATIONAL MATERIALS PROVIDED:    [] PAT Preoperative Education Packet/Booklet     [] Medication List    [] Transfusion bracelet applied with instructions    [] Shower with soap, lather and rinse well, and use CHG wipes provided the evening before surgery as instructed    [] Incentive spirometer with instructions

## 2023-03-02 ENCOUNTER — ANESTHESIA EVENT (OUTPATIENT)
Dept: OPERATING ROOM | Age: 38
End: 2023-03-02
Payer: COMMERCIAL

## 2023-03-03 ENCOUNTER — HOSPITAL ENCOUNTER (OUTPATIENT)
Dept: RADIATION ONCOLOGY | Age: 38
Discharge: HOME | End: 2023-03-03
Payer: COMMERCIAL

## 2023-03-03 ENCOUNTER — HOSPITAL ENCOUNTER (OUTPATIENT)
Age: 38
Setting detail: OUTPATIENT SURGERY
Discharge: HOME | End: 2023-03-03
Attending: OTOLARYNGOLOGY | Admitting: OTOLARYNGOLOGY
Payer: COMMERCIAL

## 2023-03-03 ENCOUNTER — ANESTHESIA (OUTPATIENT)
Dept: OPERATING ROOM | Age: 38
End: 2023-03-03
Payer: COMMERCIAL

## 2023-03-03 VITALS
TEMPERATURE: 97.4 F | HEIGHT: 61 IN | DIASTOLIC BLOOD PRESSURE: 80 MMHG | RESPIRATION RATE: 16 BRPM | HEART RATE: 78 BPM | BODY MASS INDEX: 25.11 KG/M2 | OXYGEN SATURATION: 100 % | SYSTOLIC BLOOD PRESSURE: 130 MMHG | WEIGHT: 133 LBS

## 2023-03-03 DIAGNOSIS — C32.0 VOCAL CORD CANCER (HCC): ICD-10-CM

## 2023-03-03 DIAGNOSIS — G89.18 POST-OP PAIN: Primary | ICD-10-CM

## 2023-03-03 LAB
HCG, URINE, POC: NEGATIVE
Lab: NORMAL
NEGATIVE QC PASS/FAIL: NORMAL
POSITIVE QC PASS/FAIL: NORMAL

## 2023-03-03 PROCEDURE — 3600000012 HC SURGERY LEVEL 2 ADDTL 15MIN: Performed by: OTOLARYNGOLOGY

## 2023-03-03 PROCEDURE — 3600000002 HC SURGERY LEVEL 2 BASE: Performed by: OTOLARYNGOLOGY

## 2023-03-03 PROCEDURE — 7100000000 HC PACU RECOVERY - FIRST 15 MIN: Performed by: OTOLARYNGOLOGY

## 2023-03-03 PROCEDURE — 3700000000 HC ANESTHESIA ATTENDED CARE: Performed by: OTOLARYNGOLOGY

## 2023-03-03 PROCEDURE — 31536 LARYNGOSCOPY W/BX & OP SCOPE: CPT | Performed by: OTOLARYNGOLOGY

## 2023-03-03 PROCEDURE — 7100000011 HC PHASE II RECOVERY - ADDTL 15 MIN: Performed by: OTOLARYNGOLOGY

## 2023-03-03 PROCEDURE — 6360000004 HC RX CONTRAST MEDICATION: Performed by: SPECIALIST

## 2023-03-03 PROCEDURE — 6360000002 HC RX W HCPCS

## 2023-03-03 PROCEDURE — 77290 THER RAD SIMULAJ FIELD CPLX: CPT | Performed by: SPECIALIST

## 2023-03-03 PROCEDURE — 6370000000 HC RX 637 (ALT 250 FOR IP): Performed by: OTOLARYNGOLOGY

## 2023-03-03 PROCEDURE — 7100000001 HC PACU RECOVERY - ADDTL 15 MIN: Performed by: OTOLARYNGOLOGY

## 2023-03-03 PROCEDURE — 2709999900 HC NON-CHARGEABLE SUPPLY: Performed by: OTOLARYNGOLOGY

## 2023-03-03 PROCEDURE — 77334 RADIATION TREATMENT AID(S): CPT | Performed by: SPECIALIST

## 2023-03-03 PROCEDURE — 3700000001 HC ADD 15 MINUTES (ANESTHESIA): Performed by: OTOLARYNGOLOGY

## 2023-03-03 PROCEDURE — 2580000003 HC RX 258

## 2023-03-03 PROCEDURE — 2500000003 HC RX 250 WO HCPCS

## 2023-03-03 PROCEDURE — 7100000010 HC PHASE II RECOVERY - FIRST 15 MIN: Performed by: OTOLARYNGOLOGY

## 2023-03-03 PROCEDURE — 2720000010 HC SURG SUPPLY STERILE: Performed by: OTOLARYNGOLOGY

## 2023-03-03 PROCEDURE — 88305 TISSUE EXAM BY PATHOLOGIST: CPT

## 2023-03-03 RX ORDER — SODIUM CHLORIDE 9 MG/ML
INJECTION, SOLUTION INTRAVENOUS PRN
Status: DISCONTINUED | OUTPATIENT
Start: 2023-03-03 | End: 2023-03-03 | Stop reason: HOSPADM

## 2023-03-03 RX ORDER — KETOROLAC TROMETHAMINE 30 MG/ML
INJECTION, SOLUTION INTRAMUSCULAR; INTRAVENOUS PRN
Status: DISCONTINUED | OUTPATIENT
Start: 2023-03-03 | End: 2023-03-03 | Stop reason: SDUPTHER

## 2023-03-03 RX ORDER — ROCURONIUM BROMIDE 10 MG/ML
INJECTION, SOLUTION INTRAVENOUS PRN
Status: DISCONTINUED | OUTPATIENT
Start: 2023-03-03 | End: 2023-03-03 | Stop reason: SDUPTHER

## 2023-03-03 RX ORDER — FENTANYL CITRATE 50 UG/ML
INJECTION, SOLUTION INTRAMUSCULAR; INTRAVENOUS PRN
Status: DISCONTINUED | OUTPATIENT
Start: 2023-03-03 | End: 2023-03-03 | Stop reason: SDUPTHER

## 2023-03-03 RX ORDER — SODIUM CHLORIDE 0.9 % (FLUSH) 0.9 %
5-40 SYRINGE (ML) INJECTION PRN
Status: DISCONTINUED | OUTPATIENT
Start: 2023-03-03 | End: 2023-03-03 | Stop reason: HOSPADM

## 2023-03-03 RX ORDER — SODIUM CHLORIDE 0.9 % (FLUSH) 0.9 %
5-40 SYRINGE (ML) INJECTION EVERY 12 HOURS SCHEDULED
Status: DISCONTINUED | OUTPATIENT
Start: 2023-03-03 | End: 2023-03-03 | Stop reason: HOSPADM

## 2023-03-03 RX ORDER — HYDROCODONE BITARTRATE AND ACETAMINOPHEN 5; 325 MG/1; MG/1
1 TABLET ORAL EVERY 6 HOURS PRN
Qty: 12 TABLET | Refills: 0 | Status: SHIPPED | OUTPATIENT
Start: 2023-03-03 | End: 2023-03-06

## 2023-03-03 RX ORDER — MIDAZOLAM HYDROCHLORIDE 1 MG/ML
INJECTION INTRAMUSCULAR; INTRAVENOUS PRN
Status: DISCONTINUED | OUTPATIENT
Start: 2023-03-03 | End: 2023-03-03 | Stop reason: SDUPTHER

## 2023-03-03 RX ORDER — FENTANYL CITRATE 50 UG/ML
25 INJECTION, SOLUTION INTRAMUSCULAR; INTRAVENOUS EVERY 5 MIN PRN
Status: DISCONTINUED | OUTPATIENT
Start: 2023-03-03 | End: 2023-03-03 | Stop reason: HOSPADM

## 2023-03-03 RX ORDER — ONDANSETRON 2 MG/ML
INJECTION INTRAMUSCULAR; INTRAVENOUS PRN
Status: DISCONTINUED | OUTPATIENT
Start: 2023-03-03 | End: 2023-03-03 | Stop reason: SDUPTHER

## 2023-03-03 RX ORDER — EPINEPHRINE NASAL SOLUTION 1 MG/ML
SOLUTION NASAL PRN
Status: DISCONTINUED | OUTPATIENT
Start: 2023-03-03 | End: 2023-03-03 | Stop reason: ALTCHOICE

## 2023-03-03 RX ORDER — FENTANYL CITRATE 50 UG/ML
50 INJECTION, SOLUTION INTRAMUSCULAR; INTRAVENOUS EVERY 5 MIN PRN
Status: DISCONTINUED | OUTPATIENT
Start: 2023-03-03 | End: 2023-03-03 | Stop reason: HOSPADM

## 2023-03-03 RX ORDER — PREDNISONE 20 MG/1
20 TABLET ORAL 2 TIMES DAILY
Qty: 6 TABLET | Refills: 0 | Status: SHIPPED | OUTPATIENT
Start: 2023-03-03 | End: 2023-03-06

## 2023-03-03 RX ORDER — SODIUM CHLORIDE 9 MG/ML
INJECTION, SOLUTION INTRAVENOUS CONTINUOUS PRN
Status: DISCONTINUED | OUTPATIENT
Start: 2023-03-03 | End: 2023-03-03 | Stop reason: SDUPTHER

## 2023-03-03 RX ORDER — DEXAMETHASONE SODIUM PHOSPHATE 4 MG/ML
INJECTION, SOLUTION INTRA-ARTICULAR; INTRALESIONAL; INTRAMUSCULAR; INTRAVENOUS; SOFT TISSUE PRN
Status: DISCONTINUED | OUTPATIENT
Start: 2023-03-03 | End: 2023-03-03 | Stop reason: SDUPTHER

## 2023-03-03 RX ORDER — LIDOCAINE HYDROCHLORIDE 20 MG/ML
INJECTION, SOLUTION EPIDURAL; INFILTRATION; INTRACAUDAL; PERINEURAL PRN
Status: DISCONTINUED | OUTPATIENT
Start: 2023-03-03 | End: 2023-03-03 | Stop reason: SDUPTHER

## 2023-03-03 RX ORDER — ONDANSETRON 2 MG/ML
4 INJECTION INTRAMUSCULAR; INTRAVENOUS
Status: DISCONTINUED | OUTPATIENT
Start: 2023-03-03 | End: 2023-03-03 | Stop reason: HOSPADM

## 2023-03-03 RX ORDER — MEPERIDINE HYDROCHLORIDE 25 MG/ML
12.5 INJECTION INTRAMUSCULAR; INTRAVENOUS; SUBCUTANEOUS EVERY 5 MIN PRN
Status: DISCONTINUED | OUTPATIENT
Start: 2023-03-03 | End: 2023-03-03 | Stop reason: HOSPADM

## 2023-03-03 RX ORDER — PROPOFOL 10 MG/ML
INJECTION, EMULSION INTRAVENOUS PRN
Status: DISCONTINUED | OUTPATIENT
Start: 2023-03-03 | End: 2023-03-03 | Stop reason: SDUPTHER

## 2023-03-03 RX ORDER — PROPOFOL 10 MG/ML
INJECTION, EMULSION INTRAVENOUS CONTINUOUS PRN
Status: DISCONTINUED | OUTPATIENT
Start: 2023-03-03 | End: 2023-03-03 | Stop reason: SDUPTHER

## 2023-03-03 RX ADMIN — FENTANYL CITRATE 100 MCG: 50 INJECTION, SOLUTION INTRAMUSCULAR; INTRAVENOUS at 13:43

## 2023-03-03 RX ADMIN — KETOROLAC TROMETHAMINE 30 MG: 30 INJECTION, SOLUTION INTRAMUSCULAR; INTRAVENOUS at 14:22

## 2023-03-03 RX ADMIN — FENTANYL CITRATE 50 MCG: 50 INJECTION, SOLUTION INTRAMUSCULAR; INTRAVENOUS at 14:22

## 2023-03-03 RX ADMIN — LIDOCAINE HYDROCHLORIDE 60 MG: 20 INJECTION, SOLUTION EPIDURAL; INFILTRATION; INTRACAUDAL; PERINEURAL at 13:43

## 2023-03-03 RX ADMIN — IOPAMIDOL 125 ML: 755 INJECTION, SOLUTION INTRAVENOUS at 09:41

## 2023-03-03 RX ADMIN — ONDANSETRON 4 MG: 2 INJECTION INTRAMUSCULAR; INTRAVENOUS at 14:22

## 2023-03-03 RX ADMIN — DEXAMETHASONE SODIUM PHOSPHATE 10 MG: 4 INJECTION, SOLUTION INTRAMUSCULAR; INTRAVENOUS at 13:43

## 2023-03-03 RX ADMIN — SODIUM CHLORIDE: 9 INJECTION, SOLUTION INTRAVENOUS at 13:33

## 2023-03-03 RX ADMIN — PROPOFOL 120 MG: 10 INJECTION, EMULSION INTRAVENOUS at 13:43

## 2023-03-03 RX ADMIN — MIDAZOLAM 2 MG: 1 INJECTION INTRAMUSCULAR; INTRAVENOUS at 13:35

## 2023-03-03 RX ADMIN — FENTANYL CITRATE 50 MCG: 50 INJECTION, SOLUTION INTRAMUSCULAR; INTRAVENOUS at 14:03

## 2023-03-03 RX ADMIN — FENTANYL CITRATE 50 MCG: 50 INJECTION, SOLUTION INTRAMUSCULAR; INTRAVENOUS at 13:51

## 2023-03-03 RX ADMIN — PROPOFOL 125 MCG/KG/MIN: 10 INJECTION, EMULSION INTRAVENOUS at 13:47

## 2023-03-03 RX ADMIN — ROCURONIUM BROMIDE 10 MG: 10 INJECTION, SOLUTION INTRAVENOUS at 14:01

## 2023-03-03 RX ADMIN — ROCURONIUM BROMIDE 30 MG: 10 INJECTION, SOLUTION INTRAVENOUS at 13:43

## 2023-03-03 RX ADMIN — SUGAMMADEX 240 MG: 100 INJECTION, SOLUTION INTRAVENOUS at 14:22

## 2023-03-03 ASSESSMENT — PAIN - FUNCTIONAL ASSESSMENT: PAIN_FUNCTIONAL_ASSESSMENT: NONE - DENIES PAIN

## 2023-03-03 ASSESSMENT — LIFESTYLE VARIABLES: SMOKING_STATUS: 0

## 2023-03-03 ASSESSMENT — PAIN SCALES - GENERAL: PAINLEVEL_OUTOF10: 0

## 2023-03-03 ASSESSMENT — PAIN DESCRIPTION - ORIENTATION: ORIENTATION: INNER

## 2023-03-03 ASSESSMENT — PAIN DESCRIPTION - LOCATION: LOCATION: THROAT

## 2023-03-03 NOTE — H&P
Wilman Lisa was seen and re-examined preoperatively today, March 3, 2023. There was no substantial change in her physical and medical status. Patient is fit for the proposed surgical procedure. All questions were appropriately addressed and had no further questions regarding the risks, benefits, and alternatives of the procedure. Wilman Lisa and family wished to proceed.     Eden Khan DO  Resident Physician  [facility])  Otolaryngology Residency  3/3/2023  1:29 PM

## 2023-03-03 NOTE — ANESTHESIA PRE PROCEDURE
Department of Anesthesiology  Preprocedure Note       Name:  Curtis Villalobos   Age:  40 y.o.  :  1985                                          MRN:  [id number]         Date:  3/3/2023      Surgeon: Sven Flores):  Eric Freedman DO    Procedure: Procedure(s):  DIRECT LARYNGOSCOPY WITH BIOPSY WITH PATHOLOGY  ++PATHOLOGY NOTIFIED++    Medications prior to admission:   Prior to Admission medications    Medication Sig Start Date End Date Taking? Authorizing Provider   nicotine (NICODERM CQ) 21 MG/24HR Place 1 patch onto the skin every 24 hours 23  Rodney Martin DO   buPROPion (WELLBUTRIN XL) 150 MG extended release tablet Take 1 tablet by mouth every morning 22   Rodney Martin DO       Current medications:    No current facility-administered medications for this visit. No current outpatient medications on file.      Facility-Administered Medications Ordered in Other Visits   Medication Dose Route Frequency Provider Last Rate Last Admin    sodium chloride flush 0.9 % injection 5-40 mL  5-40 mL IntraVENous 2 times per day Lili Martins, DO        sodium chloride flush 0.9 % injection 5-40 mL  5-40 mL IntraVENous PRN Lili Martins,         0.9 % sodium chloride infusion   IntraVENous PRN Lili Martins DO           Allergies:  No Known Allergies    Problem List:    Patient Active Problem List   Diagnosis Code    39 weeks gestation of pregnancy Z3A.39    Non-reassuring fetal heart rate or rhythm affecting mother Q50.1050    Other fatigue R53.83    Anxiety F41.9    Irritable bowel syndrome with diarrhea K58.0    Change in voice R49.9    Laryngitis J04.0    Acute non-recurrent frontal sinusitis J01.10    Other diseases of vocal cords J38.3    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    Pre-op exam Z01.818       Past Medical History:        Diagnosis Date    Cancer ([address]     left vocal cord    Clotting disorder ([address]     DURNING PREGNANCY    Hoarseness of voice     Palpitations     NOT RTECENTLY       Past Surgical History:        Procedure Laterality Date     SECTION N/A 2021     SECTION [facility] L[facility]     SECTION, LOW TRANSVERSE      LARYNGOSCOPY Bilateral 2023    DIRECT LARYNGOSCOPY WITH BIOPSY performed by Pia Pedroza DO at [address]         Social History:    Social History     Tobacco Use    Smoking status: Former     Packs/day: 0.50     Years: 10.00     Pack years: 5.00     Types: Cigarettes     Quit date: 2023     Years since quittin.0    Smokeless tobacco: Never   Substance Use Topics    Alcohol use: Yes     Comment: SOCIALLY                                Counseling given: Not Answered      Vital Signs (Current): There were no vitals filed for this visit.                                            BP Readings from Last 3 Encounters:   23 134/88   23 (!) 132/93   23 135/88       NPO Status:                                                                                 BMI:   Wt Readings from Last 3 Encounters:   23 133 lb (60.3 kg)   23 135 lb 11.2 oz (61.6 kg)   23 133 lb (60.3 kg)     There is no height or weight on file to calculate BMI.    CBC:   Lab Results   Component Value Date/Time    WBC 12.6 2022 01:36 PM    RBC 4.75 2022 01:36 PM    HGB 15.7 2022 01:36 PM    HCT 46.2 2022 01:36 PM    MCV 97.3 2022 01:36 PM    RDW 13.3 2022 01:36 PM     2022 01:36 PM       CMP:   Lab Results   Component Value Date/Time     2022 01:36 PM    K 4.0 2022 01:36 PM    CL 99 2022 01:36 PM    CO2 23 2022 01:36 PM    BUN 9 2022 01:36 PM    CREATININE 0.6 2022 01:36 PM    GFRAA >60 2018 03:30 PM    LABGLOM >60 2022 01:36 PM    GLUCOSE 84 2022 01:36 PM    PROT 7.5 2022 01:36 PM    CALCIUM 9.6 11/02/2022 01:36 PM    BILITOT 0.8 11/02/2022 01:36 PM    ALKPHOS 90 11/02/2022 01:36 PM    AST 19 11/02/2022 01:36 PM    ALT 10 11/02/2022 01:36 PM       POC Tests: No results for input(s): POCGLU, POCNA, POCK, POCCL, POCBUN, POCHEMO, POCHCT in the last 72 hours. Coags:   Lab Results   Component Value Date/Time    PROTIME 11.0 08/30/2021 05:00 PM    INR 1.0 08/30/2021 05:00 PM    APTT 27.4 08/30/2021 05:00 PM       HCG (If Applicable):   Lab Results   Component Value Date    PREGTESTUR positive 03/31/2016        ABGs: No results found for: PHART, PO2ART, AKJ5IUH, HAQ3UUV, BEART, V8LDLCMC     Type & Screen (If Applicable):  No results found for: LABABO, LABRH    Drug/Infectious Status (If Applicable):  No results found for: HIV, HEPCAB    COVID-19 Screening (If Applicable): No results found for: COVID19    PET CT SKULL BASE TO MID THIGH    Result Date: 2/22/2023  EXAMINATION: WHOLE BODY PET/CT 2/22/2023 TECHNIQUE: Following intravenous administration of 16.1 mCi of F18-FDG, PET imaging was acquired from the base of the head to the mid thighs. Computed tomography was used for purposes of attenuation correction and anatomic localization. Fusion imaging was utilized for interpretation. Uptake time 60 mins. Glucose level 88 mg/dl. 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 FINDINGS: HEAD/NECK: Symmetric uptake along the vocal cords with no focal lesion seen. No hypermetabolic cervical lymph nodes. CHEST: No abnormal uptake within the lungs and breasts. No hypermetabolic intrathoracic lymph nodes. ABDOMEN/PELVIS: No abnormal uptake within the solid abdominal organs. No hypermetabolic lymph nodes. A cystic left adnexal mass demonstrates no significant uptake and is almost certainly benign. BONES/SOFT TISSUE: No suspicious FDG uptake. INCIDENTAL CT FINDINGS: Extensive bilateral paranasal sinus opacification.      Symmetric uptake along the vocal cords with no focal lesion seen. No metastatic disease. Anesthesia Evaluation  Patient summary reviewed and Nursing notes reviewed no history of anesthetic complications:   Airway: Mallampati: II  TM distance: >3 FB   Neck ROM: full  Mouth opening: > = 3 FB   Dental: normal exam         Pulmonary:normal exam        (-) not a current smoker                           Cardiovascular:Negative CV ROS  Exercise tolerance: good (>4 METS),            Beta Blocker:  Not on Beta Blocker         Neuro/Psych:   Negative Neuro/Psych ROS              GI/Hepatic/Renal: Neg GI/Hepatic/Renal ROS            Endo/Other: Negative Endo/Other ROS   (+) malignancy/cancer. Abdominal:             Vascular: negative vascular ROS. Other Findings:             Anesthesia Plan      general     ASA 2       Induction: intravenous. MIPS: Postoperative opioids intended and Prophylactic antiemetics administered. Anesthetic plan and risks discussed with patient and spouse. Plan discussed with CRNA.                     Jostin Lguo RN   3/3/2023

## 2023-03-03 NOTE — ANESTHESIA POSTPROCEDURE EVALUATION
Department of Anesthesiology  Postprocedure Note    Patient: Chad Helms  MRN: [id number]  YOB: 1985  Date of evaluation: 3/3/2023      Procedure Summary     Date: 03/03/23 Room / Location: [facility] OR 05 / [facility]    Anesthesia Start: 6992 Anesthesia Stop: 0297    Procedure: DIRECT LARYNGOSCOPY WITH VOCAL CORD BIOPSY WITH TUMOR DEBULKING (Throat) Diagnosis:       Vocal cord cancer ([address]      (Vocal cord cancer ([address] [C32.0])    Surgeons: Kartik Hernandez DO Responsible Provider: Lazaro Soriano MD    Anesthesia Type: general ASA Status: 2          Anesthesia Type: No value filed.     Lor Phase I: Lor Score: 10    Lor Phase II:        Anesthesia Post Evaluation    Patient location during evaluation: PACU  Patient participation: complete - patient participated  Level of consciousness: awake  Pain score: 2  Airway patency: patent  Nausea & Vomiting: no nausea  Cardiovascular status: blood pressure returned to baseline  Respiratory status: acceptable  Hydration status: euvolemic

## 2023-03-03 NOTE — OP NOTE
Operative Note      Patient: Leanne Muñoz  YOB: 1985  MRN: [id number]    Date of Procedure: 3/3/2023    Pre-Op Diagnosis: Vocal cord cancer ([facility] [C32.0]    Post-Op Diagnosis: Same       Procedure(s):  DIRECT LARYNGOSCOPY WITH VOCAL CORD BIOPSY WITH TUMOR DEBULKING    Surgeon(s):  Gonzalez Collier DO    Assistant:   Resident: Charley George DO    Anesthesia: General    Estimated Blood Loss (mL): Minimal    Complications: None    Specimens:   ID Type Source Tests Collected by Time Destination   A : RIGHT TRUE VOCAL CORD Tissue Tissue [facility] 3/3/2023 1400    B : RIGHT LATERAL TRUE VOCAL CORD Tissue Tissue [facility] 3/3/2023 1404    C : RIGHT FALSE VOCAL CORD Tissue Tissue [facility] PATH[facility]Y [facility] 3/3/2023 1405    D : ANTERIOR COMMISURE Tissue Tissue [facility] 3/3/2023 1408    E : LEFT FALSE VOCAL CORD Tissue Tissue SURGICAL PATHOLOGY Tiffany Victoriai, DO 3/3/2023 1409    F : LEFT TRUE VOCAL CORD Tissue Tissue SURGICAL PATHOLOGY Tiffayn Peñalozalini, DO 3/3/2023 1410        Implants:  * No implants in log *      Drains: * No LDAs found *    Findings: Lesion of the left true vocal cord    Detailed Description of Procedure:     Specimens: was Obtained    DESCRIPTION OF PROCEDURE: The patient was consented preoperatively, taken back to the operating room, identified, and appropriately placed in the supine position. Given anesthesia per general intubation. Once the patient was intubated, they were turned 90 degrees, and the bed was elevated to my hip height. A tooth guard was placed in the patient's oral cavity to protect her upper teeth. Direct laryngoscopy  A Dedo scope was placed in the patient's oral cavity.  All areas of oropharynx and hypopharynx were evaluated starting with the right base of the tongue, left base of tongue right vallecula and left vallecula, then proceeding down to the left piriform sinus and left parapharyngeal space. The scope was then moved to the right piriform sinus and parapharyngeal area. The scope was then used to lift the posterior aspect of the epiglottis to evaluate the larynx, bilateral aryepiglottic folds, false vocal cords, anterior commissure, true vocal cords and subglottis. There were abnormalities found. Lesion was noted to the entire left true vocal cord. Biopsies were taken of the right true vocal cord, right lateral true vocal cord, left true vocal cord, left false vocal cord, right false vocal cord, anterior commissure. These were sent off for permanent pathology. Once this was all accomplished,   some adrenaline pledgets were placed on the area where the biopsies were   taken to calm the bleeding down. These were then removed. The laryngeal coblator was used to debulk the mass of the left true vocal cord. Coblator was set to 7 and 3. Once the mass was adequately reduced hemostasis was again achieved with adrenaline soaked pledgets. The Dedo   scope was removed. The patient was then turned back to Anesthesia for appropriate awakening. The patient's oral cavity was examined after the tooth guard was removed and teeth were all intact. Dr. Seng Khan was present and scrubbed for the entire procedure.                                    Electronically signed by Willi Romero DO on 3/3/2023 at 2:29 PM      3/3/2023  Everardo Jones  [phone]

## 2023-03-03 NOTE — DISCHARGE INSTRUCTIONS
Follow up with Dr Adonay De La Torre in 2 weeks  [phone]    Soft diet for the first few days, the advance as tolerated

## 2023-03-09 ENCOUNTER — HOSPITAL ENCOUNTER (OUTPATIENT)
Dept: RADIATION ONCOLOGY | Age: 38
Discharge: HOME | End: 2023-03-09
Payer: COMMERCIAL

## 2023-03-09 PROCEDURE — 77334 RADIATION TREATMENT AID(S): CPT | Performed by: SPECIALIST

## 2023-03-09 PROCEDURE — 77300 RADIATION THERAPY DOSE PLAN: CPT | Performed by: SPECIALIST

## 2023-03-09 PROCEDURE — 77295 3-D RADIOTHERAPY PLAN: CPT | Performed by: SPECIALIST

## 2023-03-10 ENCOUNTER — CLINICAL DOCUMENTATION (OUTPATIENT)
Dept: RADIATION ONCOLOGY | Age: 38
End: 2023-03-10

## 2023-03-10 NOTE — PROGRESS NOTES
RADIATION ONCOLOGY NOTE    Spoke to patient today. She had a second opinion with Dr. Florinda Disla at [facility] who recommended microlaser resection of TVC tumor. He had a discussion about her age and RT--she had questions accordingly that I addressed. She is scheduled to see Dr. June Dotson for follow-up on Tuesday 3/14/2023 for results of biopsy of contralateral TVC. Patient will then contact us regarding her decision re: surgery vs. XRT. Deuce Bailey MD, [address], [place] [address], [facility]    Department of Radiation Oncology  PHYSICIANS [facility]: [phone] (CYO: [phone])  [address] [place] [address]: [phone] (JOHN: [phone])  [facility]:  [phone] (TTP:  [phone])    NOTE: This report was transcribed using voice recognition software. Every effort was made to ensure accuracy; however, inadvertent computerized transcription errors may be present.

## 2023-03-10 NOTE — INTERVAL H&P NOTE
Bryn Castillo was reexamined preoperatively today, 3/10/2023. There is no substantial change in her physical status since the time of her pre-anesthesia testing. She is fit for the proposed surgical procedure. Bryn Castillo has no further questions regarding the risks, benefits, nature and alternatives of surgery and wishes to proceed.

## 2023-03-15 ENCOUNTER — OFFICE VISIT (OUTPATIENT)
Dept: ENT CLINIC | Age: 38
End: 2023-03-15

## 2023-03-15 ENCOUNTER — APPOINTMENT (OUTPATIENT)
Dept: RADIATION ONCOLOGY | Age: 38
End: 2023-03-15
Payer: COMMERCIAL

## 2023-03-15 VITALS
WEIGHT: 133 LBS | HEIGHT: 61 IN | DIASTOLIC BLOOD PRESSURE: 84 MMHG | OXYGEN SATURATION: 99 % | HEART RATE: 76 BPM | SYSTOLIC BLOOD PRESSURE: 120 MMHG | BODY MASS INDEX: 25.11 KG/M2 | TEMPERATURE: 97.7 F

## 2023-03-15 DIAGNOSIS — C32.0 SQUAMOUS CELL CARCINOMA OF LEFT VOCAL CORD (HCC): ICD-10-CM

## 2023-03-15 DIAGNOSIS — C32.0 VOCAL CORD CANCER (HCC): Primary | ICD-10-CM

## 2023-03-15 PROCEDURE — 99024 POSTOP FOLLOW-UP VISIT: CPT | Performed by: OTOLARYNGOLOGY

## 2023-03-15 ASSESSMENT — ENCOUNTER SYMPTOMS
VOMITING: 0
VOICE CHANGE: 1
ALLERGIC/IMMUNOLOGIC NEGATIVE: 1
SORE THROAT: 0
ABDOMINAL PAIN: 0
RHINORRHEA: 0
CHEST TIGHTNESS: 0
TROUBLE SWALLOWING: 0
APNEA: 0
RESPIRATORY NEGATIVE: 1
SINUS PRESSURE: 1
GASTROINTESTINAL NEGATIVE: 1
DIARRHEA: 0
EYE PAIN: 0
COLOR CHANGE: 0
EYES NEGATIVE: 1
EYE DISCHARGE: 0
SHORTNESS OF BREATH: 0

## 2023-03-15 ASSESSMENT — VISUAL ACUITY: OU: 1

## 2023-03-15 NOTE — PROGRESS NOTES
Subjective:      Patient ID:  Mar Muir is a 37 y.o. female.    HPI:  here for post op DL surgery 1 week ago.  There are changes since last visit. Pt is having issues with the voice.     Past Medical History:   Diagnosis Date    Cancer (HCC)     left vocal cord    Clotting disorder (HCC)     DURNING PREGNANCY    Hoarseness of voice     Palpitations     NOT RTECENTLY     Past Surgical History:   Procedure Laterality Date     SECTION N/A 2021     SECTION performed by Bryan Jain MD at [facility] L[facility]D OR     SECTION, LOW TRANSVERSE      LARYNGOSCOPY Bilateral 2023    DIRECT LARYNGOSCOPY WITH BIOPSY performed by Jlues Freedman DO at [facility] OR    LARYNGOSCOPY N/A 3/3/2023    DIRECT LARYNGOSCOPY WITH VOCAL CORD BIOPSY WITH TUMOR DEBULKING performed by Jules Freedman DO at [facility] OR    TUBAL LIGATION       Family History   Problem Relation Age of Onset    Cancer Mother     Asthma Mother     Heart Disease Mother     High Blood Pressure Mother     High Blood Pressure Father     Cancer Maternal Aunt     Cancer Maternal Uncle      Social History     Socioeconomic History    Marital status:      Spouse name: None    Number of children: 2    Years of education: None    Highest education level: None   Tobacco Use    Smoking status: Former     Packs/day: 0.50     Years: 10.00     Pack years: 5.00     Types: Cigarettes     Quit date: 2023     Years since quittin.0    Smokeless tobacco: Never   Vaping Use    Vaping Use: Never used   Substance and Sexual Activity    Alcohol use: Yes     Comment: SOCIALLY    Drug use: No     Social Determinants of Health     Financial Resource Strain: Low Risk     Difficulty of Paying Living Expenses: Not hard at all   Food Insecurity: No Food Insecurity    Worried About Running Out of Food in the Last Year: Never true    Ran Out of Food in the Last Year: Never true     No Known Allergies        Review of Systems   Constitutional:  Negative. Negative for appetite change. HENT:  Positive for postnasal drip, sinus pressure and voice change. Negative for rhinorrhea, sore throat and trouble swallowing. Eyes: Negative. Negative for pain, discharge and visual disturbance. Respiratory: Negative. Negative for apnea, chest tightness and shortness of breath. Cardiovascular: Negative. Negative for chest pain, palpitations and leg swelling. Gastrointestinal: Negative. Negative for abdominal pain, diarrhea and vomiting. Endocrine: Negative for cold intolerance, heat intolerance and polydipsia. Genitourinary: Negative. Negative for dysuria, flank pain and hematuria. Musculoskeletal: Negative. Negative for arthralgias, gait problem and neck pain. Skin: Negative. Negative for color change, pallor and rash. Allergic/Immunologic: Negative. Negative for environmental allergies, food allergies and immunocompromised state. Neurological: Negative. Negative for dizziness, numbness and headaches. Hematological: Negative. Negative for adenopathy. Psychiatric/Behavioral: Negative. Negative for behavioral problems and hallucinations. All other systems reviewed and are negative. Objective:   Physical Exam  Constitutional:       Appearance: Normal appearance. She is normal weight. HENT:      Head: Normocephalic and atraumatic. Right Ear: Tympanic membrane, ear canal and external ear normal. No drainage. Left Ear: Tympanic membrane, ear canal and external ear normal. No drainage. Nose: Nose normal. No nasal deformity or septal deviation. Mouth/Throat:      Mouth: Mucous membranes are moist.   Eyes:      General: Lids are normal. Vision grossly intact. Extraocular Movements: Extraocular movements intact. Conjunctiva/sclera: Conjunctivae normal.      Pupils: Pupils are equal, round, and reactive to light. Cardiovascular:      Rate and Rhythm: Normal rate.    Pulmonary:      Effort: Pulmonary effort is normal.   Musculoskeletal:         General: Normal range of motion. Cervical back: Normal range of motion. Lymphadenopathy:      Cervical: Cervical adenopathy (Bilateral lymphadenopathy) present. Skin:     Capillary Refill: Capillary refill takes less than 2 seconds. Neurological:      Mental Status: She is alert. Psychiatric:         Mood and Affect: Mood normal.           Path:  Diagnosis:   A. Right true vocal cord, biopsy: No tissue present for examination   (tissue did not survive tissue processing)     B. Right lateral true vocal cord, biopsy: Detached fragments of ciliated   epithelium admixed with blood   Negative for malignancy     C. Right false vocal cord, biopsy: No significant pathologic change     D. Anterior commissure, biopsy: Moderate to severe squamous dysplasia   Negative for invasive carcinoma     E. Left false vocal cord, biopsy: No significant pathologic change     F. Left true vocal cord, biopsy: Squamous cell carcinoma in situ     Comment:    Intradepartmental consultation is obtained on parts D and F. Assessment:       Diagnosis Orders   1. Vocal cord cancer ([facility] [address]        2. 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 If this doesn't work then consider radiation therapy  Follow up in prn.

## 2023-03-16 ENCOUNTER — APPOINTMENT (OUTPATIENT)
Dept: RADIATION ONCOLOGY | Age: 38
End: 2023-03-16
Payer: COMMERCIAL

## 2023-03-16 PROBLEM — Z01.818 PRE-OP EXAM: Status: RESOLVED | Noted: 2023-02-14 | Resolved: 2023-03-16

## 2023-03-17 ENCOUNTER — APPOINTMENT (OUTPATIENT)
Dept: RADIATION ONCOLOGY | Age: 38
End: 2023-03-17
Payer: COMMERCIAL

## 2023-03-20 ENCOUNTER — APPOINTMENT (OUTPATIENT)
Dept: RADIATION ONCOLOGY | Age: 38
End: 2023-03-20
Payer: COMMERCIAL

## 2023-03-20 RX ORDER — BUPROPION HYDROCHLORIDE 150 MG/1
150 TABLET ORAL EVERY MORNING
Qty: 30 TABLET | Refills: 3 | Status: SHIPPED | OUTPATIENT
Start: 2023-03-20

## 2023-03-21 ENCOUNTER — APPOINTMENT (OUTPATIENT)
Dept: RADIATION ONCOLOGY | Age: 38
End: 2023-03-21
Payer: COMMERCIAL

## 2023-03-22 ENCOUNTER — APPOINTMENT (OUTPATIENT)
Dept: RADIATION ONCOLOGY | Age: 38
End: 2023-03-22
Payer: COMMERCIAL

## 2023-03-23 ENCOUNTER — APPOINTMENT (OUTPATIENT)
Dept: RADIATION ONCOLOGY | Age: 38
End: 2023-03-23
Payer: COMMERCIAL

## 2023-03-24 ENCOUNTER — APPOINTMENT (OUTPATIENT)
Dept: RADIATION ONCOLOGY | Age: 38
End: 2023-03-24
Payer: COMMERCIAL

## 2023-03-27 ENCOUNTER — APPOINTMENT (OUTPATIENT)
Dept: RADIATION ONCOLOGY | Age: 38
End: 2023-03-27
Payer: COMMERCIAL

## 2023-03-28 ENCOUNTER — APPOINTMENT (OUTPATIENT)
Dept: RADIATION ONCOLOGY | Age: 38
End: 2023-03-28
Payer: COMMERCIAL

## 2023-03-29 ENCOUNTER — APPOINTMENT (OUTPATIENT)
Dept: RADIATION ONCOLOGY | Age: 38
End: 2023-03-29
Payer: COMMERCIAL

## 2023-03-30 ENCOUNTER — APPOINTMENT (OUTPATIENT)
Dept: RADIATION ONCOLOGY | Age: 38
End: 2023-03-30
Payer: COMMERCIAL

## 2023-03-31 ENCOUNTER — APPOINTMENT (OUTPATIENT)
Dept: RADIATION ONCOLOGY | Age: 38
End: 2023-03-31
Payer: COMMERCIAL

## 2023-04-05 ENCOUNTER — HOSPITAL ENCOUNTER (OUTPATIENT)
Dept: DATA CONVERSION | Facility: HOSPITAL | Age: 38
End: 2023-04-05
Attending: OTOLARYNGOLOGY | Admitting: OTOLARYNGOLOGY

## 2023-04-05 DIAGNOSIS — F41.9 ANXIETY DISORDER, UNSPECIFIED: ICD-10-CM

## 2023-04-05 DIAGNOSIS — C32.0 MALIGNANT NEOPLASM OF GLOTTIS (MULTI): ICD-10-CM

## 2023-04-05 DIAGNOSIS — Z87.891 PERSONAL HISTORY OF NICOTINE DEPENDENCE: ICD-10-CM

## 2023-04-17 RX ORDER — ESCITALOPRAM OXALATE 5 MG/1
TABLET ORAL
Qty: 30 TABLET | Refills: 3 | Status: SHIPPED | OUTPATIENT
Start: 2023-04-17

## 2023-04-24 LAB
COMPLETE PATHOLOGY REPORT: NORMAL
CONVERTED CLINICAL DIAGNOSIS-HISTORY: NORMAL
CONVERTED FINAL DIAGNOSIS: NORMAL
CONVERTED FINAL REPORT PDF LINK TO COPY AND PASTE: NORMAL
CONVERTED GROSS DESCRIPTION: NORMAL
CONVERTED INTRAOPERATIVE DIAGNOSIS: NORMAL

## 2023-06-05 ENCOUNTER — OFFICE VISIT (OUTPATIENT)
Dept: PRIMARY CARE CLINIC | Age: 38
End: 2023-06-05
Payer: COMMERCIAL

## 2023-06-05 VITALS
WEIGHT: 132 LBS | SYSTOLIC BLOOD PRESSURE: 128 MMHG | TEMPERATURE: 97.4 F | BODY MASS INDEX: 24.92 KG/M2 | HEART RATE: 85 BPM | HEIGHT: 61 IN | DIASTOLIC BLOOD PRESSURE: 86 MMHG | OXYGEN SATURATION: 99 %

## 2023-06-05 DIAGNOSIS — F17.210 CIGARETTE NICOTINE DEPENDENCE, UNCOMPLICATED: ICD-10-CM

## 2023-06-05 DIAGNOSIS — C32.0 SQUAMOUS CELL CARCINOMA OF LEFT VOCAL CORD (HCC): ICD-10-CM

## 2023-06-05 DIAGNOSIS — F41.9 ANXIETY: Primary | ICD-10-CM

## 2023-06-05 PROCEDURE — 99214 OFFICE O/P EST MOD 30 MIN: CPT | Performed by: FAMILY MEDICINE

## 2023-06-05 RX ORDER — BUPROPION HYDROCHLORIDE 150 MG/1
150 TABLET ORAL EVERY MORNING
Qty: 30 TABLET | Refills: 3 | Status: CANCELLED | OUTPATIENT
Start: 2023-06-05

## 2023-06-05 RX ORDER — ESCITALOPRAM OXALATE 10 MG/1
TABLET ORAL
Qty: 30 TABLET | Refills: 2 | Status: SHIPPED | OUTPATIENT
Start: 2023-06-05

## 2023-06-05 RX ORDER — VARENICLINE TARTRATE 0.5 MG/1
.5-1 TABLET, FILM COATED ORAL SEE ADMIN INSTRUCTIONS
Qty: 57 TABLET | Refills: 0 | Status: SHIPPED | OUTPATIENT
Start: 2023-06-05

## 2023-06-05 ASSESSMENT — ENCOUNTER SYMPTOMS
ALLERGIC/IMMUNOLOGIC NEGATIVE: 1
EYES NEGATIVE: 1
RESPIRATORY NEGATIVE: 1
GASTROINTESTINAL NEGATIVE: 1

## 2023-06-05 NOTE — PROGRESS NOTES
23     Zina Mukherjee    : 1985 Sex: female   Age: 45 y.o. Chief Complaint   Patient presents with    Medication Check    Anxiety       Prior to Admission medications    Medication Sig Start Date End Date Taking? Authorizing Provider   escitalopram (LEXAPRO) 10 MG tablet 1 qd 23  Yes Mayo Parekh DO   varenicline (CHANTIX) 0.5 MG tablet Take 1-2 tablets by mouth See Admin Instructions 0.5mg DAILY for 3 days followed by 0.5mg TWICE DAILY for 4 days followed by 1mg TWICE DAILY 23  Yes Mayo Parekh DO   nicotine (NICODERM CQ) 21 MG/24HR Place 1 patch onto the skin every 24 hours 4/10/23 4/9/24  Rajesh Kiran DO          HPI: Linda Matthews is seen today in follow-up on anxiety vocal cord squamous cell carcinoma and history of nicotine addiction. She remains off cigarettes but still some struggles. Wellbutrin was not that effective. Discontinued today. We will try Chantix with instructions and then notify me if problems follow-up in the month. Anxiety opted to increase Lexapro to 10 mg a day and reassess with next visit. Clinically she is otherwise well. Review of Systems   Constitutional: Negative. HENT: Negative. Eyes: Negative. Respiratory: Negative. Gastrointestinal: Negative. Endocrine: Negative. Genitourinary: Negative. Musculoskeletal: Negative. Skin: Negative. Allergic/Immunologic: Negative. Neurological: Negative. Hematological: Negative. Psychiatric/Behavioral: Negative. Today systems review stable meds as prescribed.         Current Outpatient Medications:     escitalopram (LEXAPRO) 10 MG tablet, 1 qd, Disp: 30 tablet, Rfl: 2    varenicline (CHANTIX) 0.5 MG tablet, Take 1-2 tablets by mouth See Admin Instructions 0.5mg DAILY for 3 days followed by 0.5mg TWICE DAILY for 4 days followed by 1mg TWICE DAILY, Disp: 57 tablet, Rfl: 0    nicotine (NICODERM CQ) 21 MG/24HR, Place 1 patch onto the skin every 24 hours, Disp: 30

## 2023-07-18 ENCOUNTER — OFFICE VISIT (OUTPATIENT)
Dept: PRIMARY CARE CLINIC | Age: 38
End: 2023-07-18
Payer: COMMERCIAL

## 2023-07-18 VITALS
DIASTOLIC BLOOD PRESSURE: 72 MMHG | TEMPERATURE: 98.6 F | WEIGHT: 137 LBS | OXYGEN SATURATION: 98 % | SYSTOLIC BLOOD PRESSURE: 112 MMHG | HEART RATE: 93 BPM | BODY MASS INDEX: 25.86 KG/M2 | HEIGHT: 61 IN

## 2023-07-18 DIAGNOSIS — J30.9 ALLERGIC SINUSITIS: ICD-10-CM

## 2023-07-18 DIAGNOSIS — F17.210 CIGARETTE NICOTINE DEPENDENCE, UNCOMPLICATED: ICD-10-CM

## 2023-07-18 DIAGNOSIS — C32.0 VOCAL CORD CANCER (HCC): Primary | ICD-10-CM

## 2023-07-18 DIAGNOSIS — F41.9 ANXIETY: ICD-10-CM

## 2023-07-18 PROCEDURE — 99214 OFFICE O/P EST MOD 30 MIN: CPT | Performed by: FAMILY MEDICINE

## 2023-07-18 RX ORDER — VARENICLINE TARTRATE 1 MG/1
1 TABLET, FILM COATED ORAL 2 TIMES DAILY
Qty: 60 TABLET | Refills: 1 | Status: SHIPPED | OUTPATIENT
Start: 2023-07-18

## 2023-07-18 RX ORDER — VARENICLINE TARTRATE 0.5 MG/1
.5-1 TABLET, FILM COATED ORAL SEE ADMIN INSTRUCTIONS
Qty: 57 TABLET | Refills: 0 | Status: SHIPPED
Start: 2023-07-18 | End: 2023-07-18

## 2023-07-18 RX ORDER — FEXOFENADINE HCL AND PSEUDOEPHEDRINE HCI 180; 240 MG/1; MG/1
1 TABLET, EXTENDED RELEASE ORAL DAILY
Qty: 30 TABLET | Refills: 5 | Status: SHIPPED | OUTPATIENT
Start: 2023-07-18

## 2023-07-18 ASSESSMENT — ENCOUNTER SYMPTOMS
GASTROINTESTINAL NEGATIVE: 1
EYES NEGATIVE: 1
ALLERGIC/IMMUNOLOGIC NEGATIVE: 1
RESPIRATORY NEGATIVE: 1

## 2023-08-01 ENCOUNTER — OFFICE VISIT (OUTPATIENT)
Dept: FAMILY MEDICINE CLINIC | Age: 38
End: 2023-08-01
Payer: COMMERCIAL

## 2023-08-01 VITALS
HEIGHT: 61 IN | SYSTOLIC BLOOD PRESSURE: 124 MMHG | DIASTOLIC BLOOD PRESSURE: 82 MMHG | HEART RATE: 96 BPM | OXYGEN SATURATION: 97 % | WEIGHT: 138 LBS | TEMPERATURE: 97.7 F | BODY MASS INDEX: 26.06 KG/M2 | RESPIRATION RATE: 18 BRPM

## 2023-08-01 DIAGNOSIS — R05.3 PERSISTENT COUGH: ICD-10-CM

## 2023-08-01 DIAGNOSIS — R07.0 PAIN IN THROAT: ICD-10-CM

## 2023-08-01 DIAGNOSIS — J01.40 ACUTE NON-RECURRENT PANSINUSITIS: Primary | ICD-10-CM

## 2023-08-01 LAB
Lab: NORMAL
PERFORMING INSTRUMENT: NORMAL
QC PASS/FAIL: NORMAL
S PYO AG THROAT QL: NORMAL
SARS-COV-2, POC: NORMAL

## 2023-08-01 PROCEDURE — 87880 STREP A ASSAY W/OPTIC: CPT | Performed by: EMERGENCY MEDICINE

## 2023-08-01 PROCEDURE — 99213 OFFICE O/P EST LOW 20 MIN: CPT | Performed by: EMERGENCY MEDICINE

## 2023-08-01 PROCEDURE — 87426 SARSCOV CORONAVIRUS AG IA: CPT | Performed by: EMERGENCY MEDICINE

## 2023-08-01 RX ORDER — AMOXICILLIN AND CLAVULANATE POTASSIUM 875; 125 MG/1; MG/1
1 TABLET, FILM COATED ORAL 2 TIMES DAILY
Qty: 20 TABLET | Refills: 0 | Status: SHIPPED | OUTPATIENT
Start: 2023-08-01 | End: 2023-08-11

## 2023-08-01 RX ORDER — GUAIFENESIN 600 MG/1
600 TABLET, EXTENDED RELEASE ORAL 2 TIMES DAILY
Qty: 30 TABLET | Refills: 0 | Status: SHIPPED | OUTPATIENT
Start: 2023-08-01 | End: 2023-08-16

## 2023-08-01 RX ORDER — BENZONATATE 200 MG/1
200 CAPSULE ORAL 3 TIMES DAILY PRN
Qty: 30 CAPSULE | Refills: 0 | Status: SHIPPED | OUTPATIENT
Start: 2023-08-01 | End: 2023-08-08

## 2023-08-01 RX ORDER — PREDNISONE 20 MG/1
20 TABLET ORAL 2 TIMES DAILY
Qty: 10 TABLET | Refills: 0 | Status: SHIPPED | OUTPATIENT
Start: 2023-08-01 | End: 2023-08-06

## 2023-08-01 ASSESSMENT — ENCOUNTER SYMPTOMS
EYE PAIN: 0
EYE REDNESS: 0
SINUS PRESSURE: 1
SHORTNESS OF BREATH: 0
EYE DISCHARGE: 0
NAUSEA: 0
SORE THROAT: 1
BACK PAIN: 0
COUGH: 1
VOMITING: 0
WHEEZING: 0
ABDOMINAL DISTENTION: 0
DIARRHEA: 0

## 2023-08-03 LAB
CULTURE: NORMAL
CULTURE: NORMAL
SPECIMEN DESCRIPTION: NORMAL

## 2023-08-28 RX ORDER — ESCITALOPRAM OXALATE 10 MG/1
TABLET ORAL
Qty: 30 TABLET | Refills: 2 | OUTPATIENT
Start: 2023-08-28

## 2023-09-06 VITALS — WEIGHT: 134.48 LBS | HEIGHT: 61 IN | BODY MASS INDEX: 25.39 KG/M2

## 2023-09-12 ENCOUNTER — OFFICE VISIT (OUTPATIENT)
Dept: PRIMARY CARE CLINIC | Age: 38
End: 2023-09-12
Payer: COMMERCIAL

## 2023-09-12 VITALS
DIASTOLIC BLOOD PRESSURE: 70 MMHG | HEART RATE: 62 BPM | WEIGHT: 140 LBS | SYSTOLIC BLOOD PRESSURE: 118 MMHG | TEMPERATURE: 98.3 F | OXYGEN SATURATION: 98 % | BODY MASS INDEX: 26.43 KG/M2 | HEIGHT: 61 IN

## 2023-09-12 DIAGNOSIS — R05.1 ACUTE COUGH: ICD-10-CM

## 2023-09-12 DIAGNOSIS — J04.0 LARYNGITIS: ICD-10-CM

## 2023-09-12 DIAGNOSIS — C32.0 SQUAMOUS CELL CARCINOMA OF LEFT VOCAL CORD (HCC): Primary | ICD-10-CM

## 2023-09-12 DIAGNOSIS — R68.89 CONGESTION OF THROAT: ICD-10-CM

## 2023-09-12 DIAGNOSIS — C32.0 SQUAMOUS CELL CARCINOMA OF LEFT VOCAL CORD (HCC): ICD-10-CM

## 2023-09-12 DIAGNOSIS — R53.83 OTHER FATIGUE: ICD-10-CM

## 2023-09-12 LAB
ABSOLUTE IMMATURE GRANULOCYTE: <0.03 K/UL (ref 0–0.58)
ALBUMIN SERPL-MCNC: 4.5 G/DL (ref 3.5–5.2)
ALP BLD-CCNC: 96 U/L (ref 35–104)
ALT SERPL-CCNC: 17 U/L (ref 0–32)
ANION GAP SERPL CALCULATED.3IONS-SCNC: 14 MMOL/L (ref 7–16)
AST SERPL-CCNC: 19 U/L (ref 0–31)
BASOPHILS ABSOLUTE: 0.04 K/UL (ref 0–0.2)
BASOPHILS RELATIVE PERCENT: 1 % (ref 0–2)
BILIRUB SERPL-MCNC: 0.5 MG/DL (ref 0–1.2)
BUN BLDV-MCNC: 7 MG/DL (ref 6–20)
C-REACTIVE PROTEIN: 12 MG/L (ref 0–5)
CALCIUM SERPL-MCNC: 9.1 MG/DL (ref 8.6–10.2)
CHLORIDE BLD-SCNC: 99 MMOL/L (ref 98–107)
CO2: 23 MMOL/L (ref 22–29)
CREAT SERPL-MCNC: 0.6 MG/DL (ref 0.5–1)
EOSINOPHILS ABSOLUTE: 0.04 K/UL (ref 0.05–0.5)
EOSINOPHILS RELATIVE PERCENT: 1 % (ref 0–6)
GFR SERPL CREATININE-BSD FRML MDRD: >60 ML/MIN/1.73M2
GLUCOSE BLD-MCNC: 81 MG/DL (ref 74–99)
HCT VFR BLD CALC: 41 % (ref 34–48)
HEMOGLOBIN: 13.7 G/DL (ref 11.5–15.5)
IMMATURE GRANULOCYTES: 0 % (ref 0–5)
LYMPHOCYTES ABSOLUTE: 2.06 K/UL (ref 1.5–4)
LYMPHOCYTES RELATIVE PERCENT: 36 % (ref 20–42)
MCH RBC QN AUTO: 31.8 PG (ref 26–35)
MCHC RBC AUTO-ENTMCNC: 33.4 G/DL (ref 32–34.5)
MCV RBC AUTO: 95.1 FL (ref 80–99.9)
MONOCYTES ABSOLUTE: 0.47 K/UL (ref 0.1–0.95)
MONOCYTES RELATIVE PERCENT: 8 % (ref 2–12)
NEUTROPHILS ABSOLUTE: 3.06 K/UL (ref 1.8–7.3)
NEUTROPHILS RELATIVE PERCENT: 54 % (ref 43–80)
PDW BLD-RTO: 12.5 % (ref 11.5–15)
PLATELET # BLD: 183 K/UL (ref 130–450)
PMV BLD AUTO: 10.9 FL (ref 7–12)
POTASSIUM SERPL-SCNC: 4.1 MMOL/L (ref 3.5–5)
RBC # BLD: 4.31 M/UL (ref 3.5–5.5)
SODIUM BLD-SCNC: 136 MMOL/L (ref 132–146)
T4 TOTAL: 6.9 UG/DL (ref 4.5–11.7)
TOTAL PROTEIN: 7.3 G/DL (ref 6.4–8.3)
TSH SERPL DL<=0.05 MIU/L-ACNC: 1.43 UIU/ML (ref 0.27–4.2)
WBC # BLD: 5.7 K/UL (ref 4.5–11.5)

## 2023-09-12 PROCEDURE — 99214 OFFICE O/P EST MOD 30 MIN: CPT | Performed by: FAMILY MEDICINE

## 2023-09-12 RX ORDER — PREDNISONE 5 MG/1
TABLET ORAL
Qty: 30 TABLET | Refills: 0 | Status: SHIPPED | OUTPATIENT
Start: 2023-09-12

## 2023-09-12 RX ORDER — AZITHROMYCIN 250 MG/1
TABLET, FILM COATED ORAL
Qty: 1 PACKET | Refills: 0 | Status: SHIPPED | OUTPATIENT
Start: 2023-09-12

## 2023-09-12 RX ORDER — FEXOFENADINE HCL AND PSEUDOEPHEDRINE HCI 180; 240 MG/1; MG/1
1 TABLET, EXTENDED RELEASE ORAL DAILY
Qty: 30 TABLET | Refills: 5 | Status: SHIPPED | OUTPATIENT
Start: 2023-09-12

## 2023-09-12 RX ORDER — VARENICLINE TARTRATE 1 MG/1
1 TABLET, FILM COATED ORAL 2 TIMES DAILY
Qty: 60 TABLET | Refills: 1 | Status: SHIPPED
Start: 2023-09-12 | End: 2023-09-12 | Stop reason: SDUPTHER

## 2023-09-12 RX ORDER — VARENICLINE TARTRATE 1 MG/1
1 TABLET, FILM COATED ORAL 2 TIMES DAILY
Qty: 60 TABLET | Refills: 1 | Status: SHIPPED | OUTPATIENT
Start: 2023-09-12

## 2023-09-12 RX ORDER — ESCITALOPRAM OXALATE 10 MG/1
TABLET ORAL
Qty: 30 TABLET | Refills: 2 | Status: SHIPPED | OUTPATIENT
Start: 2023-09-12

## 2023-09-12 ASSESSMENT — ENCOUNTER SYMPTOMS
EYES NEGATIVE: 1
RESPIRATORY NEGATIVE: 1
SORE THROAT: 1
GASTROINTESTINAL NEGATIVE: 1
ALLERGIC/IMMUNOLOGIC NEGATIVE: 1

## 2023-09-12 NOTE — PROGRESS NOTES
Readings from Last 3 Encounters:   09/12/23 118/70   08/01/23 124/82   07/18/23 112/72        Physical Exam  Vitals and nursing note reviewed. Constitutional:       Appearance: She is well-developed. HENT:      Head: Normocephalic. Right Ear: External ear normal.      Left Ear: External ear normal.      Nose: Congestion present. Mouth/Throat:      Pharynx: Posterior oropharyngeal erythema present. Eyes:      Conjunctiva/sclera: Conjunctivae normal.      Pupils: Pupils are equal, round, and reactive to light. Cardiovascular:      Rate and Rhythm: Normal rate. Pulmonary:      Breath sounds: Normal breath sounds. Abdominal:      General: Bowel sounds are normal.      Palpations: Abdomen is soft. Musculoskeletal:         General: Normal range of motion. Cervical back: Normal range of motion and neck supple. Skin:     General: Skin is warm and dry. Neurological:      Mental Status: She is alert and oriented to person, place, and time. Psychiatric:         Behavior: Behavior normal.        Today's vitals physical exam stable. Heart is regular lungs are clear. Medications as prescribed. Mild congestion throat irritation as noted and treatment provided. I will see this young lady back next week and sooner if problems blood work to be completed today. Plan Per Assessment:  Kya Watson was seen today for medication refill. 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

## 2023-09-13 LAB — SEDIMENTATION RATE, ERYTHROCYTE: 16 MM/HR (ref 0–20)

## 2023-09-14 NOTE — H&P
History of Present Illness:   Pregnant/Lactating:  ·  Are You Pregnant no   ·  Are You Currently Breastfeeding no     History Present Illness:  Reason for surgery: Vocal fold carcinoma   HPI:    HPI: No significant changes to the medical history since last clinic visit with ENT.  Briefly, patient presented to the ENT clinic for consideration of recently diagnosed  left vocal cord carcinoma as well as right vocal cord lesion.  There is discussed in the clinic that she may proceed with the variety of options to treat this cancer including but not limited to radiation, chemo, combination, or resection.  She presents  today for MicroDirect laryngoscopy, biopsy, bronchoscopy, started injection, CO2 laser.    PMH: reviewed in chart   Fam Hx: Reviewed in EMR  Social Hx: Reviewed in EMR  Allergies: Reviewed in EMR  ROS: negative except as above in HPI  Medications, imaging and pertinent labs reviewed in EMR    A/P  Proceed with planned surgery     Allergies:        Allergies:  ·  Allergy Status Unknown :     Home Medication Review:   Home Medications Reviewed: yes     Impression/Procedure:   ·  Impression and Planned Procedure: MicroDirect laryngoscopy, biopsy, bronchoscopy, steroid injection, CO2 laser       ERAS (Enhanced Recovery After Surgery):  ·  ERAS Patient: no       Physical Exam by System:    Respiratory/Thorax: Unlabored breathing on RA   Cardiovascular: No clubbing/cyanosis/edema of hands     Consent:   COVID-19 Consent:  ·  COVID-19 Risk Consent Surgeon has reviewed key risks related to the risk of kamron COVID-19 and if they contract COVID-19 what the risks are.     Attestation:   Note Completion:  I am a:  Resident/Fellow   Attending Attestation I saw and evaluated the patient.  I personally obtained the key and critical portions of the history and physical exam or was physically present for key and  critical portions performed by the resident/fellow. I reviewed the resident/fellow?s documentation  and discussed the patient with the resident/fellow.  I agree with the resident/fellow?s medical decision making as documented in the note.     I personally evaluated the patient on 05-Apr-2023   Comments/ Additional Findings    Patient and I discussed the risks, benefits and alternatives to surgery and all questions answered.  Cleared to OR.          Electronic Signatures:  Del Currie)  (Signed 05-Apr-2023 15:10)   Authored: Note Completion   Co-Signer: History of Present Illness, Allergies, Home Medication Review, Impression/Procedure, ERAS, Physical Exam, Consent, Note Completion  Mason Martins (Resident))  (Signed 05-Apr-2023 10:02)   Authored: History of Present Illness, Allergies, Home  Medication Review, Impression/Procedure, ERAS, Physical Exam, Consent, Note Completion      Last Updated: 05-Apr-2023 15:10 by Del Currie)

## 2023-09-25 ENCOUNTER — OFFICE VISIT (OUTPATIENT)
Dept: PRIMARY CARE CLINIC | Age: 38
End: 2023-09-25
Payer: COMMERCIAL

## 2023-09-25 VITALS
DIASTOLIC BLOOD PRESSURE: 70 MMHG | SYSTOLIC BLOOD PRESSURE: 112 MMHG | OXYGEN SATURATION: 100 % | RESPIRATION RATE: 17 BRPM | TEMPERATURE: 97.5 F | HEART RATE: 81 BPM

## 2023-09-25 DIAGNOSIS — R68.89 CONGESTION OF THROAT: Primary | ICD-10-CM

## 2023-09-25 DIAGNOSIS — F17.210 CIGARETTE NICOTINE DEPENDENCE, UNCOMPLICATED: ICD-10-CM

## 2023-09-25 DIAGNOSIS — F41.9 ANXIETY: ICD-10-CM

## 2023-09-25 PROCEDURE — 99214 OFFICE O/P EST MOD 30 MIN: CPT | Performed by: FAMILY MEDICINE

## 2023-09-25 ASSESSMENT — PATIENT HEALTH QUESTIONNAIRE - PHQ9
SUM OF ALL RESPONSES TO PHQ QUESTIONS 1-9: 0
2. FEELING DOWN, DEPRESSED OR HOPELESS: 0
SUM OF ALL RESPONSES TO PHQ QUESTIONS 1-9: 0
SUM OF ALL RESPONSES TO PHQ QUESTIONS 1-9: 0
SUM OF ALL RESPONSES TO PHQ9 QUESTIONS 1 & 2: 0
1. LITTLE INTEREST OR PLEASURE IN DOING THINGS: 0
SUM OF ALL RESPONSES TO PHQ QUESTIONS 1-9: 0

## 2023-09-25 ASSESSMENT — ENCOUNTER SYMPTOMS
GASTROINTESTINAL NEGATIVE: 1
RESPIRATORY NEGATIVE: 1
ALLERGIC/IMMUNOLOGIC NEGATIVE: 1
EYES NEGATIVE: 1

## 2023-09-25 NOTE — PROGRESS NOTES
23     [facility]    : 1985 Sex: female   Age: 45 y.o. Chief Complaint   Patient presents with    Follow-up     1 Week follow up on cough: patient states antibiotics/steroid did help     Other     CARE GAP: due for PAP smear: patient aware and will schedule        Prior to Admission medications    Medication Sig Start Date End Date Taking? Authorizing Provider   escitalopram (LEXAPRO) 10 MG tablet 1 qd 23  Yes Tirso Menendez DO   varenicline (CHANTIX) 1 MG tablet Take 1 tablet by mouth 2 times daily 23  Yes [place] Parekh,    fexofenadine-pseudoephedrine (ALLEGRA-D 24HR) 180-240 MG per extended release tablet Take 1 tablet by mouth daily 23  Yes Tirso Menendez DO          HPI: Jaky Mcgraw is seen today in follow-up on upper respiratory cough congestion much better at this point. Responded well to the Zithromax and prednisone and will now follow-up only if further symptoms. Doing well with staying off cigarettes. Continue the Chantix as prescribed. Anxiety well controlled on Lexapro 10 mg daily maintain. Clinically she is otherwise well. Review of Systems   Constitutional: Negative. HENT: Negative. Eyes: Negative. Respiratory: Negative. Gastrointestinal: Negative. Endocrine: Negative. Genitourinary: Negative. Musculoskeletal: Negative. Skin: Negative. Allergic/Immunologic: Negative. Neurological: Negative. Hematological: Negative. Psychiatric/Behavioral: Negative. Review stable medications as prescribed. Some mild weight gain and we did encourage weight loss Mediterranean diet and a 3-month follow-up with the goal of 130 pounds when she returns.         Current Outpatient Medications:     escitalopram (LEXAPRO) 10 MG tablet, 1 qd, Disp: 30 tablet, Rfl: 2    varenicline (CHANTIX) 1 MG tablet, Take 1 tablet by mouth 2 times daily, Disp: 60 tablet, Rfl: 1    fexofenadine-pseudoephedrine (ALLEGRA-D 24HR) 180-240 MG per

## 2023-10-02 NOTE — OP NOTE
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  MicroDirect laryngoscopy diagnostic  2.  Bronchoscopy, diagnostic  3.  MicroDirect laryngoscopy with excision of vocal fold carcinoma with CO2 laser  4.  MicroDirect laryngoscopy with injection of steroid into the vocal folds, bilateral  5.  MicroDirect laryngoscopy with injection ProLaryn gel bilateral  6.  Bilateral supraglottoplasty via CO2 laser    Anesthesia: Jeff Mtz  Estimated Blood Loss: 5cc  Findings: Right vocal fold with what appears to be either scar/keratin debris/ulceration from SCCa impact, left vocal fold lesion reexcised and margin sent, bilateral supraglottoplasty done  for visualization.    Specimens(s) Collected: yes,           Operative Report:   Indications:   37-year-old female who presented to the ENT clinic for consideration of the above issues.  Examination in the office as well as appropriate imaging if applicable was completed and therefore the above procedures were recommended.  Indications, risk, benefits,  and alternatives were discussed with the patient did sign written informed consent to proceed.  All questions were answered.  For further information please see the ambulatory EMR.    Procedure:   The patient was brought back to the operating room and transferred to the operating room table.  Preoperative huddle/timeout was performed.  The patient was pre-oxygenated with 100% oxygen via facemask.  Following time-out the patient received anesthesia  and was intubated with 5-0 MLT tube.  The bed was turned 90° to the laryngology team.    A dental guard was placed on the upper dentition.  The Dedo Laryngoscope was introduced transorally along the right side of the tongue.  No concerning masses or lesions were  identified in the oral cavity, oropharynx, or hypopharynx.  The patient had a  retroflexed and floppy epiglottis.  The glottis was anteriorly displaced resulting in challenging exposure.  Anterior cricoid pressure was applied utilizing a towel and heavy tape.  The laryngoscope was secured on the mustard stand and a 0° telescope  was utilized to inspect the airway.     The above findings were visualized.      A laser safety timeout was performed confirming all members were wearing appropriate laser safety gear as well as oxygenation was in appropriate levels.  The patient was also protected using protective eye shields as well as towels around the face.    Given where the lesions were in the somewhat difficult nature of exposing them with the Dedo laryngoscope as well as for cancer surveillance in the future decision was made to perform a bilateral supraglottoplasty.  We used the CO2 laser to carefully  remove the false vocal cords first on the left and then the right.    Once this was completed, we proceeded to address the left vocal fold lesion.  We used the CO2 laser to perform a left cordectomy of the previously excised cancerous lesion making sure to include some suspicious appearing tissue posteriorly.  This was  then passed off to pathology.  We then obtained circumferential margins and sent this to pathology for analysis.    We then proceeded to address the right vocal fold lesion.  Preservative-free saline was injected into the right vocal fold underneath the lesion.  Elevators were then utilized to inspect the area and identify the appropriate location for an incision.   Hallie knife was used to create a microflap and this was further developed using up-biting scissors.  The flap elevators were used to dissect around the mass preserving the superficial lamina propria.  Curved microscissors were then utilized to release  the anterior and posterior attachments, and finally up-biting scissors were utilized to  remove the lesion.  This lesion was then passed off his right vocal fold specimen.    After this we injected bilateral vocal folds with 1 mL and a liter of Decadron split evenly between them and concentration of 10 mg/mL.  After this, in order to provide further for support and to provide better vibratory edge apposition we then utilized  ProLaryn gel utilizing a total of 0.5 cc between the 2 size bilaterally.  At the completion of this, the vibratory edges were straight with super show lamina deep to the excised lesions.  Palpation of these areas revealed no additional lesions or fibrous  tissues.    Next, we preoxygenated and then removed the tube.  The telescope was then advanced between the vocal folds and down the trachea to the level of the mainstem bronchi.  No masses or lesions were identified.  We then replaced the laser tube via the operating  laryngoscope.        The patient was then turned back to the anesthesia team for extubation.  The patient was transferred to the PACU in stable condition.                         Attestation:   Note Completion:  Attending Attestation I was present for the entire procedure    I am a: Resident/Fellow          Electronic Signatures:  Del Currie)  (Signed 05-Apr-2023 16:49)   Authored: Post-Operative Note, Chart Review, Note Completion   Co-Signer: Post-Operative Note, Chart Review  Mason Martins (Resident))  (Signed 05-Apr-2023 12:48)   Authored: Post-Operative Note, Chart Review, Note Completion      Last Updated: 05-Apr-2023 16:49 by Del Currie)

## 2023-10-11 PROBLEM — J38.3 LESION OF VOCAL CORD: Status: ACTIVE | Noted: 2023-10-11

## 2023-10-11 PROBLEM — R49.0 DYSPHONIA: Status: ACTIVE | Noted: 2023-10-11

## 2023-10-11 PROBLEM — C32.9 LARYNGEAL CANCER (MULTI): Status: ACTIVE | Noted: 2023-10-11

## 2023-10-11 RX ORDER — VARENICLINE TARTRATE 0.5 MG/1
TABLET, FILM COATED ORAL
COMMUNITY
End: 2023-12-14 | Stop reason: ALTCHOICE

## 2023-10-11 RX ORDER — FAMCICLOVIR 250 MG/1
1 TABLET ORAL 2 TIMES DAILY
COMMUNITY
End: 2023-12-14 | Stop reason: ALTCHOICE

## 2023-10-11 RX ORDER — GUAIFENESIN 600 MG/1
TABLET, EXTENDED RELEASE ORAL
COMMUNITY
End: 2023-12-14 | Stop reason: ALTCHOICE

## 2023-10-11 RX ORDER — SERTRALINE HYDROCHLORIDE 50 MG/1
1 TABLET, FILM COATED ORAL DAILY
COMMUNITY
End: 2023-12-14 | Stop reason: ALTCHOICE

## 2023-10-11 RX ORDER — ESCITALOPRAM OXALATE 10 MG/1
TABLET ORAL
COMMUNITY

## 2023-10-11 RX ORDER — BUPROPION HYDROCHLORIDE 150 MG/1
150 TABLET ORAL
COMMUNITY
End: 2023-12-14 | Stop reason: ALTCHOICE

## 2023-10-11 RX ORDER — AMOXICILLIN AND CLAVULANATE POTASSIUM 875; 125 MG/1; MG/1
TABLET, FILM COATED ORAL
COMMUNITY
End: 2023-12-14 | Stop reason: ALTCHOICE

## 2023-10-11 RX ORDER — ESCITALOPRAM OXALATE 5 MG/1
1 TABLET ORAL DAILY
COMMUNITY
End: 2023-12-14 | Stop reason: ALTCHOICE

## 2023-10-11 RX ORDER — VARENICLINE TARTRATE 1 MG/1
TABLET, FILM COATED ORAL
COMMUNITY

## 2023-10-11 RX ORDER — BENZONATATE 200 MG/1
CAPSULE ORAL
COMMUNITY
End: 2023-12-14 | Stop reason: ALTCHOICE

## 2023-10-11 RX ORDER — IBUPROFEN 200 MG
1 TABLET ORAL EVERY 24 HOURS
COMMUNITY
End: 2023-12-14 | Stop reason: ALTCHOICE

## 2023-10-11 RX ORDER — ONDANSETRON 4 MG/1
TABLET, ORALLY DISINTEGRATING ORAL
COMMUNITY
End: 2023-12-14 | Stop reason: ALTCHOICE

## 2023-10-12 ENCOUNTER — OFFICE VISIT (OUTPATIENT)
Dept: OTOLARYNGOLOGY | Facility: CLINIC | Age: 38
End: 2023-10-12
Payer: COMMERCIAL

## 2023-10-12 VITALS — BODY MASS INDEX: 27.1 KG/M2 | WEIGHT: 143.4 LBS | TEMPERATURE: 97.6 F

## 2023-10-12 DIAGNOSIS — J38.3 LESION OF VOCAL CORD: ICD-10-CM

## 2023-10-12 DIAGNOSIS — C32.9 LARYNGEAL CANCER (MULTI): ICD-10-CM

## 2023-10-12 DIAGNOSIS — R49.0 DYSPHONIA: Primary | ICD-10-CM

## 2023-10-12 PROCEDURE — 31579 LARYNGOSCOPY TELESCOPIC: CPT | Performed by: OTOLARYNGOLOGY

## 2023-10-12 PROCEDURE — 1036F TOBACCO NON-USER: CPT | Performed by: OTOLARYNGOLOGY

## 2023-10-12 PROCEDURE — 99214 OFFICE O/P EST MOD 30 MIN: CPT | Performed by: OTOLARYNGOLOGY

## 2023-10-12 ASSESSMENT — PATIENT HEALTH QUESTIONNAIRE - PHQ9
1. LITTLE INTEREST OR PLEASURE IN DOING THINGS: NOT AT ALL
SUM OF ALL RESPONSES TO PHQ9 QUESTIONS 1 AND 2: 0
2. FEELING DOWN, DEPRESSED OR HOPELESS: NOT AT ALL

## 2023-10-12 NOTE — PROGRESS NOTES
ASSESSMENT AND PLAN:   Divya Valle is a 38 y.o. female with a history of  a bilateral vocal cord cancer s/p resection.      Today's examination to include stroboscopy/ flexible laryngoscopy demonstrates no concerning features.    We discussed and I recommended the use of a reflux and provided handouts and samples.    I would like to see the patient back in  6 weeks to ensure control. May consider 3 months follow-up after the next visit.       Reason For Consult  Chief Complaint   Patient presents with    Follow-up          HISTORY OF PRESENT ILLNESS:  Divya Valle is a 38 y.o. female presenting for a follow up visit with me for a bilateral vocal cord cancer s/p resection.      The patient reports no new complaints..        Prior History:   Last seen on 08/10/2023  with a left vocal cord cancer s/p resection. This is a bilateral tumor. She is doing well with no concerning lesions on stroboscopy. She has stiffness of the left mid cord without concerning areas.       Past Medical History  She has no past medical history on file. Surgical History  She has no past surgical history on file.   Social History  She reports that she has quit smoking. Her smoking use included cigarettes. She has a 20.00 pack-year smoking history. She has never used smokeless tobacco. No history on file for alcohol use and drug use. Allergies  Patient has no known allergies.     Family History  No family history on file.    Review of Systems  All 10 systems were reviewed and negative except for above.      Last Recorded Vitals  Temperature 36.4 °C (97.6 °F), weight 65 kg (143 lb 6.4 oz).    Physical Exam  ENT Physical Exam  Constitutional  Appearance: patient appears well-developed and well-nourished,  Head and Face  Appearance: head appears normal and face appears normal;  Ear  Auricles: right auricle normal; left auricle normal;  Nose  External Nose: nares patent bilaterally;  Oral Cavity/Oropharynx  Lips:  normal;  Nasopharynx/Hypopharynx/Larynx  Larynx Findings: epiglottis normal; aryepiglottis normal; false vocal cords normal; arytenoids normal; bilateral true vocal cords normal; subglottis normal;  Neck  Neck: neck normal; neck palpation normal;  Respiratory  Inspection: no retractions visible;  Cardiovascular  Inspection: no peripheral edema present;  Neurovestibular  Mental Status: alert and oriented;  Psychiatric: mood normal;  Cranial Nerves: cranial nerves intact;        Relevant Results       Patient Reported Outcome Measures         Radiology, Laboratory and Pathology  No results found.      Procedures   Flexible Laryngoscopy w/ Videostroboscopy    VOICE AND SPEECH CHARACTERISTICS:  Normal spoken speech, no dysphonia, no roughness, no breathiness, no asthenia, (+) mild strain.  [id number]    Pitch: normal    Intelligibility: normal.   Resonance: balanced.   Vocal Loudness: normal.   Breath Support: normal.    PROCEDURE:    Indications: voice change  PROCEDURE NOTE: FLEXIBLE LARYNGOSCOPY WITH STROBOSCOPY  I recommended a flexible laryngoscopy with stroboscopy based on PE findings, and/or concern for mucosal wave details based upon history and/or for issues associated with hyperreflexic gag on mirror exam concerning for pathology. Risks, benefits, and alternatives were explained. The patient wishes to proceed and gives verbal consent.   Patient is seated in the exam chair. After adequate topical anesthesia, I advance the flexible endoscope. The examination included evaluation of the malone, vallecula, base of tongue, pyriforms, post-cricoid area, larynx and immediate subglottis.  Findings : assessment of the nasopharynx, base of tongue/vallecula, pyriform sinuses, post-cricoid area and pharyngeal walls was without lesion or mass    Gross Arytenoid Movement: symmetric.  Arytenoid Height: normal.   Supraglottic Tension: lateral.  Symmetry: asymmetry.   Amplitude: reduced: left.  Phase Closure:  in-phase.  Periodicity: normal.  Closure: closed.    Time Spent  Prep time on day of patient encounter: 10 minutes  Time spent directly with patient, family or caregiver: 15 minutes  Additional Time Spent on Patient Care Activities: 5 minutes  Documentation Time: 10 minutes  Other Time Spent: 0 minutes  Total: 40 minutes     Scribe Attestation  By signing my name below, Malathi OWEN, Scribsalbador   attest that this documentation has been prepared under the direction and in the presence of Del Currie MD.

## 2023-10-12 NOTE — LETTER
October 12, 2023       No Recipients    Patient: Divya Valle   YOB: 1985   Date of Visit: 10/12/2023       Dear Dr. Encarnacion Recipients:    Thank you for referring Divya Valle to me for evaluation. Below are my notes for this consultation.  If you have questions, please do not hesitate to call me. I look forward to following your patient along with you.       Sincerely,     Del Currie MD      CC:   No Recipients  ______________________________________________________________________________________    ASSESSMENT AND PLAN:   Divya Valle is a 38 y.o. female with a history of  a bilateral vocal cord cancer s/p resection.      Today's examination to include stroboscopy/ flexible laryngoscopy demonstrates no concerning features.recommended use of reflux, provided handouts and samples    We discussed    I would like to see the patient back in  6 weeks to ensure control. May consider 3 months of follow-up after next visit.       Reason For Consult  Chief Complaint   Patient presents with    Follow-up          HISTORY OF PRESENT ILLNESS:  Divya Valle is a 38 y.o. female presenting for a follow up visit with me for  a bilateral vocal cord cancer s/p resection.      The patient reports no new complaints..      *** Note to scribe: Please add any specifics discussed such as location of symptoms, duration/onset of symptoms, any factors that worsen or improve their symptoms, and severity (how it impacts life).  Add any associated conditions - for example, swallowing issues associated with their cough or voice concerns if discussed.  At the end of the HPI, include pertinent negatives: for example, They described no swallowing, voice concerns or cough.   Copy forward the Assessment and plan from last not below.     Prior History:   Last seen on 08/10/2023  with a left vocal cord cancer s/p resection. This is a bilateral tumor. She is doing well with no concerning lesions on stroboscopy. She has stiffness of the  "left mid cord without concerning areas.       Past Medical History  She has no past medical history on file. Surgical History  She has no past surgical history on file.   Social History  She reports that she has quit smoking. Her smoking use included cigarettes. She has a 20.00 pack-year smoking history. She has never used smokeless tobacco. No history on file for alcohol use and drug use. Allergies  Patient has no known allergies.     Family History  No family history on file.    Review of Systems  All 10 systems were reviewed and negative except for above.      Last Recorded Vitals  Temperature 36.4 °C (97.6 °F), weight 65 kg (143 lb 6.4 oz).    Physical Exam  ENT Physical Exam  Constitutional  Appearance: patient appears well-developed and well-nourished,  Head and Face  Appearance: head appears normal and face appears normal;  Ear  Auricles: right auricle normal; left auricle normal;  Nose  External Nose: nares patent bilaterally;  Oral Cavity/Oropharynx  Lips: normal;  Nasopharynx/Hypopharynx/Larynx  Larynx Findings: epiglottis normal; aryepiglottis normal; false vocal cords normal; arytenoids normal; bilateral true vocal cords normal; subglottis normal;  Neck  Neck: neck normal; neck palpation normal;  Respiratory  Inspection: no retractions visible;  Cardiovascular  Inspection: no peripheral edema present;  Neurovestibular  Mental Status: alert and oriented;  Psychiatric: mood normal;  Cranial Nerves: cranial nerves intact;        Relevant Results       Patient Reported Outcome Measures         Radiology, Laboratory and Pathology  No results found.      Procedures   Flexible Laryngoscopy w/ Videostroboscopy    VOICE AND SPEECH CHARACTERISTICS:  Normal spoken speech, {Grade:74305}, {Roughness:38294}, {breathiness:13165}, {asthenia:26067}, {strain:55057}.  [id number]  Additional Voice Characteristics: {Additional Voice Characteristics (Optional):24064}  Pitch: {Pitch:86472}  {acoustic measures:15556::\" f o: ***  " "Hz, MPT: *** sec, s/z ratio: *** seconds, f Range: *** Hz. \"}.  Intelligibility: {Intelligibility:08886}.   Resonance: {Resonance:39512}.   Vocal Loudness: {Vocal Loudness:30765}.   Breath Support: {Breath Support:67998}.    PROCEDURE:    Indications: {Indications:52349}  {procedure:88796}  Patient is seated in the exam chair. After adequate topical anesthesia, I advance the flexible endoscope. The examination included evaluation of the malone, vallecula, base of tongue, pyriforms, post-cricoid area, larynx and immediate subglottis.  {Findings (Optional):65436}  {Reflux scoring (Optional):17707}  Gross Arytenoid Movement: symmetric.  Arytenoid Height: normal.   Supraglottic Tension: lateral.  Symmetry: asymmetry.   Amplitude: reduced: left.  Phase Closure: in-phase.  Mucosal Wave Lateral Excursion/Secondary Wave: {Muscosal wave laterality:12958}.  Periodicity: normal.  Closure: closed.  Additional Findings: ***  Assessment: {assessment:20257}.     Time Spent  Prep time on day of patient encounter: 10 minutes  Time spent directly with patient, family or caregiver: 15 minutes  Additional Time Spent on Patient Care Activities: 5 minutes  Documentation Time: 10 minutes  Other Time Spent: 0 minutes  Total: 40 minutes       ASSESSMENT AND PLAN:   Divya Valle is a 38 y.o. female with a history of  a left vocal cord cancer s/p resection.      Today's examination to include stroboscopy/ flexible laryngoscopy demonstrates    We discussed    I would like to see the patient back in         Reason For Consult  No chief complaint on file.       HISTORY OF PRESENT ILLNESS:  Divya Valle is a 38 y.o. female presenting for a follow up visit with me for  a left vocal cord cancer s/p resection.      The patient reports ***.      *** Note to scribe: Please add any specifics discussed such as location of symptoms, duration/onset of symptoms, any factors that worsen or improve their symptoms, and severity (how it impacts life).  Add any " associated conditions - for example, swallowing issues associated with their cough or voice concerns if discussed.  At the end of the HPI, include pertinent negatives: for example, They described no swallowing, voice concerns or cough.   Copy forward the Assessment and plan from last not below.     Prior History:   Last seen on 08/10/2023  with a left vocal cord cancer s/p resection. This is a bilateral tumor. She is doing well with no concerning lesions on stroboscopy. She has stiffness of the left mid cord without concerning areas.       Past Medical History  She has no past medical history on file. Surgical History  She has no past surgical history on file.   Social History  She has no history on file for tobacco use, alcohol use, and drug use. Allergies  Patient has no allergy information on record.     Family History  No family history on file.    Review of Systems  All 10 systems were reviewed and negative except for above.      Last Recorded Vitals  There were no vitals taken for this visit.    Physical Exam  ENT Physical Exam  Constitutional  Appearance: patient appears well-developed and well-nourished,  Head and Face  Appearance: head appears normal and face appears normal;  Ear  Auricles: right auricle normal; left auricle normal;  Nose  External Nose: nares patent bilaterally;  Oral Cavity/Oropharynx  Lips: normal;  Neck  Neck: neck normal; neck palpation normal;  Respiratory  Inspection: no retractions visible;  Cardiovascular  Inspection: no peripheral edema present;  Neurovestibular  Mental Status: alert and oriented;  Psychiatric: mood normal;  Cranial Nerves: cranial nerves intact;        Relevant Results       Patient Reported Outcome Measures         Radiology, Laboratory and Pathology  No results found.      Procedures   Flexible Laryngoscopy w/ Videostroboscopy    VOICE AND SPEECH CHARACTERISTICS:  Normal spoken speech, {Grade:67301}, {Roughness:12880}, {breathiness:08460}, {asthenia:52674},  "{strain:59829}.    Additional Voice Characteristics: {Additional Voice Characteristics (Optional):12951}  Pitch: {Pitch:17103}  {acoustic measures:64957::\" f o: ***  Hz, MPT: *** sec, s/z ratio: *** seconds, f Range: *** Hz. \"}.  Intelligibility: {Intelligibility:90562}.   Resonance: {Resonance:09727}.   Vocal Loudness: {Vocal Loudness:13457}.   Breath Support: {Breath Support:66936}.    PROCEDURE:    Indications: {Indications:16143}  {procedure:37922}  Patient is seated in the exam chair. After adequate topical anesthesia, I advance the flexible endoscope. The examination included evaluation of the malone, vallecula, base of tongue, pyriforms, post-cricoid area, larynx and immediate subglottis.  {Findings (Optional):09426}  {Reflux scoring (Optional):31935}  Gross Arytenoid Movement: {Gross Arytenoid Movement:80958}.  Arytenoid Height: {Arytenoid Height:09269}.   Supraglottic Tension: {Supraglottic Tension:09227}.  Symmetry: {symmetry:18473}.   Amplitude: {Amplitude:61813}.  Phase Closure: {Phase closure:87671}.  Mucosal Wave Lateral Excursion/Secondary Wave: {Muscosal wave laterality:06965}.  Periodicity: {Periodicity:55387}.  Adynamic: ***  Mass: ***  Closure: {vocal cord closure:21961}.  Additional Findings: ***  Assessment: {assessment:46956}.     Time Spent  Prep time on day of patient encounter: 10 minutes  Time spent directly with patient, family or caregiver: 15 minutes  Additional Time Spent on Patient Care Activities: 5 minutes  Documentation Time: 10 minutes  Other Time Spent: 0 minutes  Total: 40 minutes       "

## 2023-12-14 ENCOUNTER — OFFICE VISIT (OUTPATIENT)
Dept: OTOLARYNGOLOGY | Facility: CLINIC | Age: 38
End: 2023-12-14
Payer: COMMERCIAL

## 2023-12-14 VITALS — HEIGHT: 61 IN | TEMPERATURE: 97.7 F | WEIGHT: 140.7 LBS | BODY MASS INDEX: 26.56 KG/M2

## 2023-12-14 DIAGNOSIS — R49.0 MUSCLE TENSION DYSPHONIA: ICD-10-CM

## 2023-12-14 DIAGNOSIS — R49.8 VOICE FATIGUE: ICD-10-CM

## 2023-12-14 DIAGNOSIS — Z85.21 ENCOUNTER FOR FOLLOW-UP SURVEILLANCE OF LARYNGEAL CANCER: Primary | ICD-10-CM

## 2023-12-14 DIAGNOSIS — Z08 ENCOUNTER FOR FOLLOW-UP SURVEILLANCE OF LARYNGEAL CANCER: Primary | ICD-10-CM

## 2023-12-14 PROCEDURE — 99214 OFFICE O/P EST MOD 30 MIN: CPT | Performed by: OTOLARYNGOLOGY

## 2023-12-14 PROCEDURE — 31579 LARYNGOSCOPY TELESCOPIC: CPT | Performed by: OTOLARYNGOLOGY

## 2023-12-14 PROCEDURE — 1036F TOBACCO NON-USER: CPT | Performed by: OTOLARYNGOLOGY

## 2023-12-14 NOTE — PROGRESS NOTES
"ASSESSMENT AND PLAN:   Divya Valle is a 38 y.o. female with a history of a bilateral vocal cord cancer s/p resection.      Today's examination to include stroboscopy demonstrates no concerning features. She has mild a disruption to mild vibratory wave on the left consistent with previous history of surgery. No findings of concern on narrowband imaging.     I would like to see the patient back in February.                Reason For Consult  Chief Complaint   Patient presents with    Follow-up     6 week FUV.        HISTORY OF PRESENT ILLNESS:  Divya Valle is a 38 y.o. female presenting for a follow up visit with me for a bilateral vocal cord cancer s/p excision.      The patient reports she is doing well. She had a recent yellow drained from her noise. She has intermittent pitch breaks.         Prior History:   Last seen on 10/12/2023 history of  a bilateral vocal cord cancer s/p resection.       Exam  demonstrated no concerning features.     We discussed and I recommended the use of a reflux and provided handouts and samples.        Past Medical History  She has no past medical history on file. Surgical History  She has no past surgical history on file.   Social History  She reports that she has quit smoking. Her smoking use included cigarettes. She has a 20.00 pack-year smoking history. She has never used smokeless tobacco. No history on file for alcohol use and drug use. Allergies  Patient has no known allergies.     Family History  No family history on file.    Review of Systems  All 10 systems were reviewed and negative except for above.      Last Recorded Vitals  Temperature 36.5 °C (97.7 °F), height 1.549 m (5' 1\"), weight 63.8 kg (140 lb 11.2 oz).    Physical Exam  ENT Physical Exam  Constitutional  Appearance: patient appears well-developed and well-nourished,  Head and Face  Appearance: head appears normal and face appears normal;  Ear  Auricles: right auricle normal; left auricle normal;  Nose  External " Nose: nares patent bilaterally;  Oral Cavity/Oropharynx  Lips: normal;  Neck  Neck: neck normal; neck palpation normal;  Respiratory  Inspection: no retractions visible;  Cardiovascular  Inspection: no peripheral edema present;  Neurovestibular  Mental Status: alert and oriented;  Psychiatric: mood normal;  Cranial Nerves: cranial nerves intact;          Procedures     Flexible Laryngoscopy w/ Videostroboscopy    VOICE AND SPEECH CHARACTERISTICS:  Normal spoken speech, no dysphonia, no roughness, no breathiness, no asthenia, (+) mild strain.    Intelligibility: normal.   Resonance: balanced.   Vocal Loudness: normal.   Breath Support: normal.    PROCEDURE:    Indications: voice change  PROCEDURE NOTE: FLEXIBLE LARYNGOSCOPY WITH STROBOSCOPY  I recommended a flexible laryngoscopy with stroboscopy based on PE findings, and/or concern for mucosal wave details based upon history and/or for issues associated with hyperreflexic gag on mirror exam concerning for pathology. Risks, benefits, and alternatives were explained. The patient wishes to proceed and gives verbal consent.   Patient is seated in the exam chair. After adequate topical anesthesia, I advance the flexible endoscope. The examination included evaluation of the malone, vallecula, base of tongue, pyriforms, post-cricoid area, larynx and immediate subglottis.  Findings : assessment of the nasopharynx, base of tongue/vallecula, pyriform sinuses, post-cricoid area and pharyngeal walls was without lesion or mass, pharyngeal wall contraction is normal and symmetric, and no pooling of secretions  IA thickenin (mild)  Gross Arytenoid Movement: symmetric.  Arytenoid Height: normal.   Supraglottic Tension: none.  Symmetry: asymmetry.   Amplitude: reduced: left.  Phase Closure: in-phase.  Mucosal Wave Lateral Excursion/Secondary Wave: Right Vocal Cord: mild restriction - Wave moved more than ¼, less than ½ the width of the vocal fold and Left Vocal Cord: moderate  restriction - Wave moved up to ¼ the width of the vocal fold.  Periodicity: normal.  Closure: post. gap.        Time Spent  Prep time on day of patient encounter: 10 minutes  Time spent directly with patient, family or caregiver: 15 minutes  Additional Time Spent on Patient Care Activities: 5 minutes  Documentation Time: 10 minutes  Other Time Spent: 0 minutes  Total: 40 minutes     Scribe Attestation  By signing my name below, I, Edda Leelee , Scribe attest that this documentation has been prepared under the direction and in the presence of Del Currie MD.

## 2023-12-26 RX ORDER — ESCITALOPRAM OXALATE 10 MG/1
TABLET ORAL
Qty: 30 TABLET | Refills: 2 | Status: SHIPPED | OUTPATIENT
Start: 2023-12-26

## 2023-12-26 NOTE — TELEPHONE ENCOUNTER
Patients last appointment 9/25/2023.   Patients next scheduled appointment   Future Appointments   Date Time Provider [address]   12/26/2023  4:00 PM DO ROBERTO Yu [facility]

## 2024-01-24 ENCOUNTER — OFFICE VISIT (OUTPATIENT)
Dept: PRIMARY CARE CLINIC | Age: 39
End: 2024-01-24
Payer: COMMERCIAL

## 2024-01-24 VITALS
DIASTOLIC BLOOD PRESSURE: 78 MMHG | BODY MASS INDEX: 24.75 KG/M2 | OXYGEN SATURATION: 98 % | HEART RATE: 90 BPM | SYSTOLIC BLOOD PRESSURE: 118 MMHG | TEMPERATURE: 97.8 F | WEIGHT: 131 LBS

## 2024-01-24 DIAGNOSIS — C32.0 VOCAL CORD CANCER (HCC): Primary | ICD-10-CM

## 2024-01-24 DIAGNOSIS — F41.9 ANXIETY: ICD-10-CM

## 2024-01-24 DIAGNOSIS — C32.0 SQUAMOUS CELL CARCINOMA OF LEFT VOCAL CORD (HCC): ICD-10-CM

## 2024-01-24 PROCEDURE — 99214 OFFICE O/P EST MOD 30 MIN: CPT | Performed by: FAMILY MEDICINE

## 2024-01-24 RX ORDER — BUPROPION HYDROCHLORIDE 150 MG/1
150 TABLET ORAL EVERY MORNING
Qty: 30 TABLET | Refills: 3 | Status: SHIPPED | OUTPATIENT
Start: 2024-01-24

## 2024-01-24 RX ORDER — FEXOFENADINE HCL AND PSEUDOEPHEDRINE HCI 180; 240 MG/1; MG/1
1 TABLET, EXTENDED RELEASE ORAL DAILY
Qty: 30 TABLET | Refills: 5 | Status: SHIPPED | OUTPATIENT
Start: 2024-01-24

## 2024-01-24 RX ORDER — ESCITALOPRAM OXALATE 10 MG/1
TABLET ORAL
Qty: 30 TABLET | Refills: 2 | Status: SHIPPED | OUTPATIENT
Start: 2024-01-24

## 2024-01-24 RX ORDER — NICOTINE 21 MG/24HR
1 PATCH, TRANSDERMAL 24 HOURS TRANSDERMAL EVERY 24 HOURS
Qty: 30 PATCH | Refills: 3 | Status: SHIPPED | OUTPATIENT
Start: 2024-01-24 | End: 2025-01-23

## 2024-01-24 ASSESSMENT — PATIENT HEALTH QUESTIONNAIRE - PHQ9
SUM OF ALL RESPONSES TO PHQ QUESTIONS 1-9: 0
SUM OF ALL RESPONSES TO PHQ QUESTIONS 1-9: 0
1. LITTLE INTEREST OR PLEASURE IN DOING THINGS: 0
SUM OF ALL RESPONSES TO PHQ9 QUESTIONS 1 & 2: 0
SUM OF ALL RESPONSES TO PHQ QUESTIONS 1-9: 0
SUM OF ALL RESPONSES TO PHQ QUESTIONS 1-9: 0
2. FEELING DOWN, DEPRESSED OR HOPELESS: 0

## 2024-01-24 ASSESSMENT — ENCOUNTER SYMPTOMS
RESPIRATORY NEGATIVE: 1
ALLERGIC/IMMUNOLOGIC NEGATIVE: 1
EYES NEGATIVE: 1
GASTROINTESTINAL NEGATIVE: 1

## 2024-01-24 NOTE — PROGRESS NOTES
24     Mar Muir    : 1985 Sex: female   Age: 38 y.o.      Chief Complaint   Patient presents with    Anxiety       Prior to Admission medications    Medication Sig Start Date End Date Taking? Authorizing Provider   escitalopram (LEXAPRO) 10 MG tablet take 1 tablet by mouth once daily 24  Yes Reynaldo Parekh DO   fexofenadine-pseudoephedrine (ALLEGRA-D 24HR) 180-240 MG per extended release tablet Take 1 tablet by mouth daily 24  Yes Reynaldo Parekh DO   buPROPion (WELLBUTRIN XL) 150 MG extended release tablet Take 1 tablet by mouth every morning 24  Yes Reynaldo Parekh DO   nicotine (NICODERM CQ) 21 MG/24HR Place 1 patch onto the skin every 24 hours 24 Yes Reynaldo Parekh DO          HPI: Patient evaluated today with history of vocal cord cancer squamous cell abnormality and she has done very well since removal.  She has a history of chronic smoking and did fairly well with Chantix but then started problems with nausea on the medication and has discontinued.  Currently smoking intermittently so we are going to try her with NicoDerm patch as well as Wellbutrin  daily and then reassess here in a month.  Remainder meds as prescribed and any difficulty with med she is to call me.          Review of Systems   Constitutional: Negative.    HENT: Negative.     Eyes: Negative.    Respiratory: Negative.     Gastrointestinal: Negative.    Endocrine: Negative.    Genitourinary: Negative.    Musculoskeletal: Negative.    Skin: Negative.    Allergic/Immunologic: Negative.    Neurological: Negative.    Hematological: Negative.    Psychiatric/Behavioral: Negative.        Today systems review stable meds as prescribed.        Current Outpatient Medications:     escitalopram (LEXAPRO) 10 MG tablet, take 1 tablet by mouth once daily, Disp: 30 tablet, Rfl: 2    fexofenadine-pseudoephedrine (ALLEGRA-D 24HR) 180-240 MG per extended release tablet, Take 1 tablet by mouth

## 2024-02-22 ENCOUNTER — OFFICE VISIT (OUTPATIENT)
Dept: OTOLARYNGOLOGY | Facility: CLINIC | Age: 39
End: 2024-02-22
Payer: COMMERCIAL

## 2024-02-22 VITALS — BODY MASS INDEX: 25.69 KG/M2 | HEIGHT: 62 IN | WEIGHT: 139.6 LBS | TEMPERATURE: 97.6 F

## 2024-02-22 DIAGNOSIS — R49.8 OTHER VOICE AND RESONANCE DISORDERS: Primary | ICD-10-CM

## 2024-02-22 DIAGNOSIS — C32.9 LARYNGEAL CANCER (MULTI): ICD-10-CM

## 2024-02-22 PROCEDURE — 31579 LARYNGOSCOPY TELESCOPIC: CPT | Performed by: OTOLARYNGOLOGY

## 2024-02-22 PROCEDURE — 99214 OFFICE O/P EST MOD 30 MIN: CPT | Performed by: OTOLARYNGOLOGY

## 2024-02-22 PROCEDURE — 1036F TOBACCO NON-USER: CPT | Performed by: OTOLARYNGOLOGY

## 2024-02-22 ASSESSMENT — PATIENT HEALTH QUESTIONNAIRE - PHQ9
1. LITTLE INTEREST OR PLEASURE IN DOING THINGS: NOT AT ALL
2. FEELING DOWN, DEPRESSED OR HOPELESS: NOT AT ALL
SUM OF ALL RESPONSES TO PHQ9 QUESTIONS 1 AND 2: 0

## 2024-02-22 NOTE — PROGRESS NOTES
ASSESSMENT AND PLAN:   Divya Valle is a 38 y.o. female with a history of a bilateral vocal cord cancer s/p resection. She is a nonsmoker and has reflux findings    Today's examination to include stroboscopy demonstrates minimal findings with mild redness of the posterior arytenoid. No masses or lesions. No lymphadenopathy. Her voice quality is excellent.    We discussed getting a CXR for her annual visit that is coming up this April 2024. She will be one year out from her curative surgery.     I would like to see the patient back in 6-8 weeks.        Reason For Consult  No chief complaint on file.       HISTORY OF PRESENT ILLNESS:  Divya Valle is a 38 y.o. female presenting for a follow up visit with me for a bilateral vocal cord cancer s/p resection.      The patient reports that she has been doing well. She has intermittent pitch breaks. She reports nasal drainage and sinus pressure. She takes Allegra-D. She has not tried nasal irrigations.      She reports fatigue and reports that a thyroid panel was normal.        Prior History:   Last seen on 12/14/2023  with a history of a bilateral vocal cord cancer s/p resection.       Today's examination to include stroboscopy demonstrates no concerning features. She has mild a disruption to mild vibratory wave on the left consistent with previous history of surgery. No findings of concern on narrowband imaging.      I would like to see the patient back in February     Past Medical History  She has no past medical history on file. Surgical History  She has no past surgical history on file.   Social History  She reports that she has quit smoking. Her smoking use included cigarettes. She has a 20.00 pack-year smoking history. She has never used smokeless tobacco. No history on file for alcohol use and drug use. Allergies  Patient has no known allergies.     Family History  No family history on file.    Review of Systems  All 10 systems were reviewed and negative except for  above.      Last Recorded Vitals  There were no vitals taken for this visit.    Physical Exam  ENT Physical Exam  Constitutional  Appearance: patient appears well-developed and well-nourished,  Head and Face  Appearance: head appears normal and face appears normal;  Ear  Auricles: right auricle normal; left auricle normal;  Nose  External Nose: nares patent bilaterally;  Oral Cavity/Oropharynx  Lips: normal;  Neck  Neck: neck normal; neck palpation normal;  Respiratory  Inspection: no retractions visible;  Cardiovascular  Inspection: no peripheral edema present;  Neurovestibular  Mental Status: alert and oriented;  Psychiatric: mood normal;  Cranial Nerves: cranial nerves intact;             Procedures   Flexible Laryngoscopy w/ Videostroboscopy    VOICE AND SPEECH CHARACTERISTICS:  Normal spoken speech, no dysphonia, no roughness, no breathiness, no asthenia, no strain.    Intelligibility: normal.   Resonance: balanced.   Vocal Loudness: normal.   Breath Support: normal.    PROCEDURE:    Indications: voice change  PROCEDURE NOTE: FLEXIBLE LARYNGOSCOPY WITH STROBOSCOPY  I recommended a flexible laryngoscopy with stroboscopy based on PE findings, and/or concern for mucosal wave details based upon history and/or for issues associated with hyperreflexic gag on mirror exam concerning for pathology. Risks, benefits, and alternatives were explained. The patient wishes to proceed and gives verbal consent.   Patient is seated in the exam chair. After adequate topical anesthesia, I advance the flexible endoscope. The examination included evaluation of the malone, vallecula, base of tongue, pyriforms, post-cricoid area, larynx and immediate subglottis.  Findings : assessment of the nasopharynx, base of tongue/vallecula, pyriform sinuses, post-cricoid area and pharyngeal walls was without lesion or mass, pharyngeal wall contraction is normal and symmetric, and no pooling of secretions  ventricular obliteration: 2  (present)  Gross Arytenoid Movement: symmetric.  Arytenoid Height: normal.   Supraglottic Tension: none.  Symmetry: asymmetry.   Amplitude: reduced: left.  Phase Closure: in-phase.  Mucosal Wave Lateral Excursion/Secondary Wave: Right Vocal Cord: mild restriction - Wave moved more than ¼, less than ½ the width of the vocal fold and Left Vocal Cord: moderate restriction - Wave moved up to ¼ the width of the vocal fold.  Periodicity: normal.  Closure: post. gap.    Time Spent  Prep time on day of patient encounter: 10 minutes  Time spent directly with patient, family or caregiver: 15 minutes  Additional Time Spent on Patient Care Activities/Discussion with SLP re care plan: 5 minutes  Documentation Time: 10 minutes  Other Time Spent: 0 minutes  Total: 40 minutes       Scribe Attestation  By signing my name below, IEdda , Scribsalbador attest that this documentation has been prepared under the direction and in the presence of Del Currie MD.

## 2024-03-26 RX ORDER — ESCITALOPRAM OXALATE 10 MG/1
TABLET ORAL
Qty: 30 TABLET | Refills: 2 | OUTPATIENT
Start: 2024-03-26

## 2024-04-25 ENCOUNTER — HOSPITAL ENCOUNTER (OUTPATIENT)
Dept: RADIOLOGY | Facility: CLINIC | Age: 39
Discharge: HOME | End: 2024-04-25
Payer: COMMERCIAL

## 2024-04-25 ENCOUNTER — OFFICE VISIT (OUTPATIENT)
Dept: OTOLARYNGOLOGY | Facility: CLINIC | Age: 39
End: 2024-04-25
Payer: COMMERCIAL

## 2024-04-25 VITALS — TEMPERATURE: 97.7 F | HEIGHT: 62 IN | BODY MASS INDEX: 26.17 KG/M2 | WEIGHT: 142.2 LBS

## 2024-04-25 DIAGNOSIS — C32.9 LARYNGEAL CANCER (MULTI): ICD-10-CM

## 2024-04-25 DIAGNOSIS — M54.2 NECK DISCOMFORT: Primary | ICD-10-CM

## 2024-04-25 PROCEDURE — 99213 OFFICE O/P EST LOW 20 MIN: CPT | Performed by: OTOLARYNGOLOGY

## 2024-04-25 PROCEDURE — 71046 X-RAY EXAM CHEST 2 VIEWS: CPT

## 2024-04-25 PROCEDURE — 71046 X-RAY EXAM CHEST 2 VIEWS: CPT | Performed by: RADIOLOGY

## 2024-04-25 PROCEDURE — 31579 LARYNGOSCOPY TELESCOPIC: CPT | Performed by: OTOLARYNGOLOGY

## 2024-04-25 PROCEDURE — 1036F TOBACCO NON-USER: CPT | Performed by: OTOLARYNGOLOGY

## 2024-04-25 ASSESSMENT — PATIENT HEALTH QUESTIONNAIRE - PHQ9
SUM OF ALL RESPONSES TO PHQ9 QUESTIONS 1 AND 2: 0
1. LITTLE INTEREST OR PLEASURE IN DOING THINGS: NOT AT ALL
2. FEELING DOWN, DEPRESSED OR HOPELESS: NOT AT ALL

## 2024-04-25 NOTE — PROGRESS NOTES
"ASSESSMENT AND PLAN:   Divya Valle is a 38 y.o. female with a history of laryngeal cancer.  She has been doing quite well with no concerning features on my examination.  Recommended a chest x-ray for an annual eval.  Follow-up in 3 months       Reason For Consult  Chief Complaint   Patient presents with    Follow-up     6 week follow up visit.        HISTORY OF PRESENT ILLNESS:  Divya Valle is a 38 y.o. female presenting for a follow up visit with me for history of laryngeal cancer.  The patient reports mild discomfort on the left lateral neck.  This appears to be a fullness/muscle tension sensation.      Prior History:   bilateral vocal cord cancer s/p resection.       Today's examination to include stroboscopy/ flexible laryngoscopy demonstrates no concerning features.     We discussed and I recommended the use of a reflux and provided handouts and samples.     I would like to see the patient back in  6 weeks to ensure control. May consider 3 months follow-up after the next visit.       Past Medical History  She has no past medical history on file. Surgical History  She has no past surgical history on file.   Social History  She reports that she has quit smoking. Her smoking use included cigarettes. She has a 20 pack-year smoking history. She has never used smokeless tobacco. No history on file for alcohol use and drug use. Allergies  Patient has no known allergies.     Family History  No family history on file.    Review of Systems  All 10 systems were reviewed and negative except for above.      Last Recorded Vitals  Temperature 36.5 °C (97.7 °F), height 1.575 m (5' 2\"), weight 64.5 kg (142 lb 3.2 oz).    Physical Exam  ENT Physical Exam  Constitutional  Appearance: patient appears well-developed and well-nourished,  Head and Face  Appearance: head appears normal and face appears normal;  Ear  Auricles: right auricle normal; left auricle normal;  Nose  External Nose: nares patent bilaterally;  Oral " Cavity/Oropharynx  Lips: normal;  Neck  Neck: neck normal; neck palpation normal;  Respiratory  Inspection: no retractions visible;  Cardiovascular  Inspection: no peripheral edema present;  Neurovestibular  Mental Status: alert and oriented;  Psychiatric: mood normal;  Cranial Nerves: cranial nerves intact;        Relevant Results       Patient Reported Outcome Measures         Radiology, Laboratory and Pathology  No results found.   VOICE AND SPEECH CHARACTERISTICS:  Normal spoken speech, no dysphonia, no roughness, no breathiness, no asthenia, no strain.    Intelligibility: normal.   Resonance: balanced.   Vocal Loudness: normal.   Breath Support: normal.    PROCEDURE:    Indications: voice change and globus sensation  PROCEDURE NOTE: FLEXIBLE LARYNGOSCOPY WITH STROBOSCOPY  I recommended a flexible laryngoscopy with stroboscopy based on PE findings, and/or concern for mucosal wave details based upon history and/or for issues associated with hyperreflexic gag on mirror exam concerning for pathology. Risks, benefits, and alternatives were explained. The patient wishes to proceed and gives verbal consent.   Patient is seated in the exam chair. After adequate topical anesthesia, I advance the flexible endoscope. The examination included evaluation of the malone, vallecula, base of tongue, pyriforms, post-cricoid area, larynx and immediate subglottis.  Findings : assessment of the nasopharynx, base of tongue/vallecula, pyriform sinuses, post-cricoid area and pharyngeal walls was without lesion or mass, pharyngeal wall contraction is normal and symmetric, and no pooling of secretions  Reflux finding score: 4/26  (>7 95% significant)  Gross Arytenoid Movement: symmetric.  Arytenoid Height: normal.   Supraglottic Tension: none.  Symmetry: mild asymmetry  Amplitude: increased on the right  Phase Closure: in-phase.  Mucosal Wave Lateral Excursion/Secondary Wave: Right Vocal Cord: no restriction - wave moved more than ½ the  width of the vocal fold.  Left: Mild restriction.   Periodicity: normal.  Adynamic: right mid TVC  Mass: none  Closure: closed.    Assessment: change in voice.      Time Spent  Prep time on day of patient encounter: 10 minutes  Time spent directly with patient, family or caregiver: 15 minutes  Additional Time Spent on Patient Care Activities/Discussion with SLP re care plan: 5 minutes  Documentation Time: 10 minutes  Other Time Spent: 0 minutes  Total: 40 minutes

## 2024-05-14 ENCOUNTER — OFFICE VISIT (OUTPATIENT)
Dept: PRIMARY CARE CLINIC | Age: 39
End: 2024-05-14
Payer: COMMERCIAL

## 2024-05-14 VITALS
OXYGEN SATURATION: 99 % | DIASTOLIC BLOOD PRESSURE: 80 MMHG | HEART RATE: 90 BPM | SYSTOLIC BLOOD PRESSURE: 132 MMHG | HEIGHT: 61 IN | TEMPERATURE: 98 F | WEIGHT: 137 LBS | BODY MASS INDEX: 25.86 KG/M2

## 2024-05-14 DIAGNOSIS — F41.9 ANXIETY: ICD-10-CM

## 2024-05-14 DIAGNOSIS — C32.0 VOCAL CORD CANCER (HCC): Primary | ICD-10-CM

## 2024-05-14 DIAGNOSIS — R53.83 OTHER FATIGUE: ICD-10-CM

## 2024-05-14 DIAGNOSIS — C32.0 VOCAL CORD CANCER (HCC): ICD-10-CM

## 2024-05-14 LAB
BASOPHILS ABSOLUTE: 0.03 K/UL (ref 0–0.2)
BASOPHILS RELATIVE PERCENT: 0 % (ref 0–2)
EOSINOPHILS ABSOLUTE: 0.13 K/UL (ref 0.05–0.5)
EOSINOPHILS RELATIVE PERCENT: 2 % (ref 0–6)
HCT VFR BLD CALC: 43.7 % (ref 34–48)
HEMOGLOBIN: 14.5 G/DL (ref 11.5–15.5)
IMMATURE GRANULOCYTES %: 0 % (ref 0–5)
IMMATURE GRANULOCYTES ABSOLUTE: <0.03 K/UL (ref 0–0.58)
LYMPHOCYTES ABSOLUTE: 2.42 K/UL (ref 1.5–4)
LYMPHOCYTES RELATIVE PERCENT: 32 % (ref 20–42)
MCH RBC QN AUTO: 32.4 PG (ref 26–35)
MCHC RBC AUTO-ENTMCNC: 33.2 G/DL (ref 32–34.5)
MCV RBC AUTO: 97.5 FL (ref 80–99.9)
MONOCYTES ABSOLUTE: 0.54 K/UL (ref 0.1–0.95)
MONOCYTES RELATIVE PERCENT: 7 % (ref 2–12)
NEUTROPHILS ABSOLUTE: 4.5 K/UL (ref 1.8–7.3)
NEUTROPHILS RELATIVE PERCENT: 59 % (ref 43–80)
PDW BLD-RTO: 12.3 % (ref 11.5–15)
PLATELET # BLD: 227 K/UL (ref 130–450)
PMV BLD AUTO: 10.6 FL (ref 7–12)
RBC # BLD: 4.48 M/UL (ref 3.5–5.5)
WBC # BLD: 7.6 K/UL (ref 4.5–11.5)

## 2024-05-14 PROCEDURE — 99214 OFFICE O/P EST MOD 30 MIN: CPT | Performed by: FAMILY MEDICINE

## 2024-05-14 RX ORDER — NICOTINE 21 MG/24HR
1 PATCH, TRANSDERMAL 24 HOURS TRANSDERMAL EVERY 24 HOURS
Qty: 30 PATCH | Refills: 3 | Status: SHIPPED | OUTPATIENT
Start: 2024-05-14 | End: 2025-05-14

## 2024-05-14 RX ORDER — FEXOFENADINE HCL AND PSEUDOEPHEDRINE HCI 180; 240 MG/1; MG/1
1 TABLET, EXTENDED RELEASE ORAL DAILY
Qty: 30 TABLET | Refills: 5 | Status: SHIPPED | OUTPATIENT
Start: 2024-05-14

## 2024-05-14 RX ORDER — VARENICLINE TARTRATE 0.5 MG/1
.5-1 TABLET, FILM COATED ORAL SEE ADMIN INSTRUCTIONS
Qty: 57 TABLET | Refills: 0 | Status: SHIPPED | OUTPATIENT
Start: 2024-05-14

## 2024-05-14 RX ORDER — ESCITALOPRAM OXALATE 10 MG/1
TABLET ORAL
Qty: 30 TABLET | Refills: 2 | Status: SHIPPED | OUTPATIENT
Start: 2024-05-14

## 2024-05-14 RX ORDER — BUPROPION HYDROCHLORIDE 150 MG/1
150 TABLET ORAL EVERY MORNING
Qty: 30 TABLET | Refills: 3 | Status: SHIPPED | OUTPATIENT
Start: 2024-05-14

## 2024-05-14 SDOH — ECONOMIC STABILITY: FOOD INSECURITY: WITHIN THE PAST 12 MONTHS, THE FOOD YOU BOUGHT JUST DIDN'T LAST AND YOU DIDN'T HAVE MONEY TO GET MORE.: NEVER TRUE

## 2024-05-14 SDOH — ECONOMIC STABILITY: FOOD INSECURITY: WITHIN THE PAST 12 MONTHS, YOU WORRIED THAT YOUR FOOD WOULD RUN OUT BEFORE YOU GOT MONEY TO BUY MORE.: NEVER TRUE

## 2024-05-14 SDOH — ECONOMIC STABILITY: HOUSING INSECURITY
IN THE LAST 12 MONTHS, WAS THERE A TIME WHEN YOU DID NOT HAVE A STEADY PLACE TO SLEEP OR SLEPT IN A SHELTER (INCLUDING NOW)?: NO

## 2024-05-14 SDOH — ECONOMIC STABILITY: INCOME INSECURITY: HOW HARD IS IT FOR YOU TO PAY FOR THE VERY BASICS LIKE FOOD, HOUSING, MEDICAL CARE, AND HEATING?: NOT HARD AT ALL

## 2024-05-14 SDOH — ECONOMIC STABILITY: TRANSPORTATION INSECURITY
IN THE PAST 12 MONTHS, HAS LACK OF TRANSPORTATION KEPT YOU FROM MEETINGS, WORK, OR FROM GETTING THINGS NEEDED FOR DAILY LIVING?: NO

## 2024-05-14 ASSESSMENT — ENCOUNTER SYMPTOMS
RESPIRATORY NEGATIVE: 1
GASTROINTESTINAL NEGATIVE: 1
ALLERGIC/IMMUNOLOGIC NEGATIVE: 1
EYES NEGATIVE: 1

## 2024-05-14 NOTE — PROGRESS NOTES
1.549 m (5' 1\")     BP Readings from Last 3 Encounters:   05/14/24 132/80   01/24/24 118/78   09/25/23 112/70        Physical Exam  Vitals and nursing note reviewed.   Constitutional:       Appearance: She is well-developed.   HENT:      Head: Normocephalic.      Right Ear: External ear normal.      Left Ear: External ear normal.      Nose: Nose normal.   Eyes:      Conjunctiva/sclera: Conjunctivae normal.      Pupils: Pupils are equal, round, and reactive to light.   Cardiovascular:      Rate and Rhythm: Normal rate.   Pulmonary:      Breath sounds: Normal breath sounds.   Abdominal:      General: Bowel sounds are normal.      Palpations: Abdomen is soft.   Musculoskeletal:         General: Normal range of motion.      Cervical back: Normal range of motion and neck supple.   Skin:     General: Skin is warm and dry.   Neurological:      Mental Status: She is alert and oriented to person, place, and time.   Psychiatric:         Behavior: Behavior normal.        Afebrile vitals are stable.  Heart is regular lungs are clear.  Medications as prescribed.  Reassessment with me next month sooner if problems.  Initiated Chantix as noted.          Plan Per Assessment:  Diagnoses and all orders for this visit:    Vocal cord cancer (HCC)  -     CBC with Auto Differential; Future  -     Comprehensive Metabolic Panel; Future  -     Lipid Panel; Future  -     T4; Future  -     TSH; Future  -     Vitamin B12 & Folate; Future    Anxiety  -     CBC with Auto Differential; Future  -     Comprehensive Metabolic Panel; Future  -     Lipid Panel; Future  -     T4; Future  -     TSH; Future  -     Vitamin B12 & Folate; Future    Other fatigue  -     CBC with Auto Differential; Future  -     Comprehensive Metabolic Panel; Future  -     Lipid Panel; Future  -     T4; Future  -     TSH; Future  -     Vitamin B12 & Folate; Future    Other orders  -     buPROPion (WELLBUTRIN XL) 150 MG extended release tablet; Take 1 tablet by mouth every

## 2024-05-15 LAB
ALBUMIN: 4.4 G/DL (ref 3.5–5.2)
ALP BLD-CCNC: 87 U/L (ref 35–104)
ALT SERPL-CCNC: 12 U/L (ref 0–32)
ANION GAP SERPL CALCULATED.3IONS-SCNC: 15 MMOL/L (ref 7–16)
AST SERPL-CCNC: 19 U/L (ref 0–31)
BILIRUB SERPL-MCNC: 0.3 MG/DL (ref 0–1.2)
BUN BLDV-MCNC: 11 MG/DL (ref 6–20)
CALCIUM SERPL-MCNC: 9.5 MG/DL (ref 8.6–10.2)
CHLORIDE BLD-SCNC: 102 MMOL/L (ref 98–107)
CHOLESTEROL, TOTAL: 192 MG/DL
CO2: 22 MMOL/L (ref 22–29)
CREAT SERPL-MCNC: 0.7 MG/DL (ref 0.5–1)
FOLATE: 5 NG/ML (ref 4.8–24.2)
GFR, ESTIMATED: >90 ML/MIN/1.73M2
GLUCOSE BLD-MCNC: 79 MG/DL (ref 74–99)
HDLC SERPL-MCNC: 78 MG/DL
LDL CHOLESTEROL: 92 MG/DL
POTASSIUM SERPL-SCNC: 4.5 MMOL/L (ref 3.5–5)
SODIUM BLD-SCNC: 139 MMOL/L (ref 132–146)
T4 TOTAL: 5.6 UG/DL (ref 4.5–11.7)
TOTAL PROTEIN: 7.1 G/DL (ref 6.4–8.3)
TRIGL SERPL-MCNC: 108 MG/DL
TSH SERPL DL<=0.05 MIU/L-ACNC: 1.45 UIU/ML (ref 0.27–4.2)
VITAMIN B-12: 256 PG/ML (ref 211–946)
VLDLC SERPL CALC-MCNC: 22 MG/DL

## 2024-06-02 ENCOUNTER — TELEPHONE (OUTPATIENT)
Dept: PRIMARY CARE CLINIC | Age: 39
End: 2024-06-02

## 2024-06-02 RX ORDER — DOXYCYCLINE HYCLATE 100 MG
100 TABLET ORAL 2 TIMES DAILY
Qty: 10 TABLET | Refills: 0 | Status: SHIPPED | OUTPATIENT
Start: 2024-06-02 | End: 2024-06-07

## 2024-06-11 RX ORDER — VARENICLINE TARTRATE 0.5 MG/1
.5-1 TABLET, FILM COATED ORAL SEE ADMIN INSTRUCTIONS
Qty: 57 TABLET | Refills: 0 | Status: SHIPPED | OUTPATIENT
Start: 2024-06-11

## 2024-07-09 RX ORDER — VARENICLINE TARTRATE 0.5 MG/1
TABLET, FILM COATED ORAL
Qty: 56 TABLET | Refills: 0 | OUTPATIENT
Start: 2024-07-09

## 2024-07-18 ENCOUNTER — APPOINTMENT (OUTPATIENT)
Dept: OTOLARYNGOLOGY | Facility: CLINIC | Age: 39
End: 2024-07-18
Payer: COMMERCIAL

## 2024-07-18 VITALS — BODY MASS INDEX: 25.68 KG/M2 | TEMPERATURE: 97.7 F | WEIGHT: 140.4 LBS

## 2024-07-18 DIAGNOSIS — J30.9 ALLERGIC RHINITIS, UNSPECIFIED SEASONALITY, UNSPECIFIED TRIGGER: ICD-10-CM

## 2024-07-18 ASSESSMENT — PATIENT HEALTH QUESTIONNAIRE - PHQ9
2. FEELING DOWN, DEPRESSED OR HOPELESS: NOT AT ALL
1. LITTLE INTEREST OR PLEASURE IN DOING THINGS: NOT AT ALL
SUM OF ALL RESPONSES TO PHQ9 QUESTIONS 1 AND 2: 0

## 2024-07-18 NOTE — PROGRESS NOTES
ASSESSMENT AND PLAN:   Divya Valle is a 39 y.o. 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      Bilateral vocal cord cancer status post-resection  - No evidence of disease since last visit. The patient is doing well considering her past medical history. The physical examination showed expected changes after surgery and tumor removal, but no abnormalities were found.  - Follow up in three months.    Allergy issues  - The patient has a lot of drainage and feels clogged all the time. These symptoms are likely due to allergies.  - Recommendation of Flonase, a nasal spray for allergies.       Reason For Consult  Chief Complaint   Patient presents with    Follow-up        HISTORY OF PRESENT ILLNESS:  Divya Valle is a 39 y.o. female presenting for a follow up visit with me for Lx cancer follow up.      Divya Bocanegra, a 39-year-old female with a history of bilateral vulval cord cancer status post-resection, reports no evidence of disease since her last visit on April 25th, 2024. She experiences daily voice cracking and has a lot of drainage due to allergy issues. She also reports feeling clogged all the time and her  has noticed hair loss. She denies any relation between these symptoms and her past medical history. She has not been using Flonase, a nasal spray for allergies, but is open to trying it. She also reports feeling dizzy and mentions her son recently broke his wrist. She denies any pain or abnormal feelings.    Prior History:   CXR 4/25/24 - clear      bilateral vocal cord cancer s/p resection.       Today's examination to include stroboscopy/ flexible laryngoscopy demonstrates no concerning features.     We discussed and I recommended the use of a reflux and provided handouts and samples.     I would like to see the patient back in  6 weeks to ensure control. May consider 3 months follow-up after the next visit.     Past Medical History  She has no past medical history on file. Surgical  History  She has no past surgical history on file.   Social History  She reports that she has quit smoking. Her smoking use included cigarettes. She has a 20 pack-year smoking history. She has never used smokeless tobacco. No history on file for alcohol use and drug use. Allergies  Patient has no known allergies.     Family History  No family history on file.    Review of Systems  All 10 systems were reviewed and negative except for above.      Last Recorded Vitals  Temperature 36.5 °C (97.7 °F), weight 63.7 kg (140 lb 6.4 oz).    Physical Exam  ENT Physical Exam  Constitutional  Appearance: patient appears well-developed and well-nourished,  Head and Face  Appearance: head appears normal and face appears normal;  Ear  Auricles: right auricle normal; left auricle normal;  Nose  External Nose: nares patent bilaterally;  Oral Cavity/Oropharynx  Lips: normal;  Neck  Neck: neck normal; neck palpation normal;  Respiratory  Inspection: no retractions visible;  Cardiovascular  Inspection: no peripheral edema present;  Neurovestibular  Mental Status: alert and oriented;  Psychiatric: mood normal;  Cranial Nerves: cranial nerves intact;        Relevant Results       Patient Reported Outcome Measures         Radiology, Laboratory and Pathology  No results found.      Procedures   Flexible Laryngoscopy w/ Videostroboscopy    VOICE AND SPEECH CHARACTERISTICS:  Normal spoken speech, no dysphonia, no roughness, no breathiness, no asthenia, no strain.    Additional Voice Characteristics:   Pitch: normal  .  Intelligibility: normal.   Resonance: balanced.   Vocal Loudness: normal.   Breath Support: normal.    Reflux Finding Score  Subglottic edema: 0  Thick Mucus: 0  Granuloma: 0  Ventricular Obliteration: 0  Erythema/Hyperemia: 0  IA Thickenin  TVC Edema: 1  Diffuse Laryngitis: 0     PROCEDURE:    Indications: voice change and Hx Lx cancer  PROCEDURE NOTE: FLEXIBLE LARYNGOSCOPY WITH STROBOSCOPY  I recommended a flexible  laryngoscopy with stroboscopy based on PE findings, and/or concern for mucosal wave details based upon history and/or for issues associated with hyperreflexic gag on mirror exam concerning for pathology. Risks, benefits, and alternatives were explained. The patient wishes to proceed and gives verbal consent.   Patient is seated in the exam chair. After adequate topical anesthesia, I advance the flexible endoscope. The examination included evaluation of the malone, vallecula, base of tongue, pyriforms, post-cricoid area, larynx and immediate subglottis.      Gross Arytenoid Movement: symmetric.  Arytenoid Height: normal.   Supraglottic Tension: none.  Symmetry: normal.   Amplitude: normal.  Phase Closure: in-phase.  Mucosal Wave Lateral Excursion/Secondary Wave: Bilateral Vocal Cord: mild restriction - Wave moved more than ¼, less than ½ the width of the vocal fold.  Periodicity: normal.  Adynamic: minimal  Mass: None  Closure: closed.  Mild posterior gap.   Additional Findings:   A narrow band imaging was used to look at blood vessels and stroboscopy was performed. No abnormalities were found. The patient has expected changes after surgery and tumor removal, but vibration is present and vocal quality is good.  Assessment: JANA        Time Spent  Prep time on day of patient encounter: 10 minutes  Time spent directly with patient, family or caregiver: 15 minutes  Additional Time Spent on Patient Care Activities/Discussion with SLP re care plan: 5 minutes  Documentation Time: 10 minutes  Other Time Spent: 0 minutes  Total: 40 minutes

## 2024-07-20 RX ORDER — FLUTICASONE PROPIONATE 50 MCG
2 SPRAY, SUSPENSION (ML) NASAL DAILY
Qty: 16 G | Refills: 11 | Status: SHIPPED | OUTPATIENT
Start: 2024-07-20 | End: 2025-07-20

## 2024-09-25 RX ORDER — ESCITALOPRAM OXALATE 10 MG/1
TABLET ORAL
Qty: 30 TABLET | Refills: 0 | OUTPATIENT
Start: 2024-09-25

## 2024-09-27 RX ORDER — ESCITALOPRAM OXALATE 10 MG/1
TABLET ORAL
Qty: 30 TABLET | Refills: 0 | Status: SHIPPED | OUTPATIENT
Start: 2024-09-27

## 2024-10-17 ENCOUNTER — APPOINTMENT (OUTPATIENT)
Dept: OTOLARYNGOLOGY | Facility: CLINIC | Age: 39
End: 2024-10-17
Payer: COMMERCIAL

## 2024-10-17 VITALS — WEIGHT: 142.9 LBS | TEMPERATURE: 97.8 F | BODY MASS INDEX: 26.98 KG/M2 | HEIGHT: 61 IN

## 2024-10-17 DIAGNOSIS — Z85.21 HX OF LARYNGEAL CANCER: Primary | ICD-10-CM

## 2024-10-17 DIAGNOSIS — R49.0 MUSCLE TENSION DYSPHONIA: ICD-10-CM

## 2024-10-17 DIAGNOSIS — R49.8 VOICE FATIGUE: ICD-10-CM

## 2024-10-17 DIAGNOSIS — R49.0 VOICE HOARSENESS: ICD-10-CM

## 2024-10-17 PROCEDURE — 3008F BODY MASS INDEX DOCD: CPT | Performed by: OTOLARYNGOLOGY

## 2024-10-17 PROCEDURE — 31579 LARYNGOSCOPY TELESCOPIC: CPT | Performed by: OTOLARYNGOLOGY

## 2024-10-17 PROCEDURE — 99214 OFFICE O/P EST MOD 30 MIN: CPT | Performed by: OTOLARYNGOLOGY

## 2024-10-17 NOTE — PROGRESS NOTES
ASSESSMENT AND PLAN:   Divya Valle is a 39 y.o. female with a history of a [id number] SCCa, who is here for surveillance.      Today's examination to include stroboscopy demonstrates no concerning lesions. She hs a vascular ectasia on the right TVC. No lymphadenopathy. She has concerns for LPR/globus sensation and muscle tightness. He previous imaging was stable.      We discussed Gaviscon after meals and bedtime.     I would like to see the patient back in 3 months.        Reason For Consult  No chief complaint on file.       HISTORY OF PRESENT ILLNESS:  Divya Valle is a 39 y.o. female presenting for a follow up visit for [id number] SCCa surveillance.      The patient reports a sensation of globus sensation. This is worse when laying down. She has a cough due to PND.      Prior History:   Bilateral vocal cord cancer s/p laser resection.  [id number]     Last examination to include stroboscopy/ flexible laryngoscopy demonstrates no concerning features.     We discussed and I recommended the use of a reflux and provided handouts and samples.     I would like to see the patient back in  6 weeks to ensure control. May consider 3 months follow-up after the next visit.       Past Medical History  She has no past medical history on file. Surgical History  She has no past surgical history on file.   Social History  She reports that she has quit smoking. Her smoking use included cigarettes. She has a 20 pack-year smoking history. She has never used smokeless tobacco. No history on file for alcohol use and drug use. Allergies  Patient has no known allergies.     Family History  No family history on file.    Review of Systems  All 10 systems were reviewed and negative except for above.      Last Recorded Vitals  There were no vitals taken for this visit.    Physical Exam  ENT Physical Exam  Constitutional  Appearance: patient appears well-developed and well-nourished,  Head and Face  Appearance: head appears normal and face appears  normal;  Ear  Auricles: right auricle normal; left auricle normal;  Nose  External Nose: nares patent bilaterally;  Oral Cavity/Oropharynx  Lips: normal;  Neck  Neck: neck normal; neck palpation normal;  Respiratory  Inspection: no retractions visible;  Cardiovascular  Inspection: no peripheral edema present;  Neurovestibular  Mental Status: alert and oriented;  Psychiatric: mood normal;  Cranial Nerves: cranial nerves intact;           Procedures     Flexible Laryngoscopy w/ Videostroboscopy    VOICE AND SPEECH CHARACTERISTICS:  Normal spoken speech, no dysphonia, no roughness, no breathiness, no asthenia, (+) mild strain.    Intelligibility: normal.   Resonance: balanced.   Vocal Loudness: normal.   Breath Support: normal.    PROCEDURE:    Indications: voice change  PROCEDURE NOTE: FLEXIBLE LARYNGOSCOPY WITH STROBOSCOPY  I recommended a flexible laryngoscopy with stroboscopy based on PE findings, and/or concern for mucosal wave details based upon history and/or for issues associated with hyperreflexic gag on mirror exam concerning for pathology. Risks, benefits, and alternatives were explained. The patient wishes to proceed and gives verbal consent.   Patient is seated in the exam chair. After adequate topical anesthesia, I advance the flexible endoscope. The examination included evaluation of the malone, vallecula, base of tongue, pyriforms, post-cricoid area, larynx and immediate subglottis.  Findings : assessment of the nasopharynx, base of tongue/vallecula, pyriform sinuses, post-cricoid area and pharyngeal walls was without lesion or mass, pharyngeal wall contraction is normal and symmetric, and no pooling of secretions  erythema/hyperemia: 4 (complete)  Gross Arytenoid Movement: symmetric.  Arytenoid Height: normal.   Supraglottic Tension: lateral.  Symmetry: asymmetry.   Amplitude: normal.  Phase Closure: in-phase.  Mucosal Wave Lateral Excursion/Secondary Wave: Right Vocal Cord: mild restriction - Wave moved  more than ¼, less than ½ the width of the vocal fold.  Periodicity: normal.  Closure: post. gap.      Time Spent  Prep time on day of patient encounter: 10 minutes  Time spent directly with patient, family or caregiver: 15 minutes  Additional Time Spent on Patient Care Activities/Discussion with SLP re care plan: 5 minutes  Documentation Time: 10 minutes  Other Time Spent: 0 minutes  Total: 40 minutes     Scribe Attestation  By signing my name below, I, Edda Mckoy , Scribe attest that this documentation has been prepared under the direction and in the presence of Del Currie MD.

## 2024-10-24 RX ORDER — ESCITALOPRAM OXALATE 10 MG/1
TABLET ORAL
Qty: 30 TABLET | Refills: 0 | Status: SHIPPED | OUTPATIENT
Start: 2024-10-24

## 2024-11-19 ENCOUNTER — OFFICE VISIT (OUTPATIENT)
Dept: PRIMARY CARE CLINIC | Age: 39
End: 2024-11-19

## 2024-11-19 VITALS
SYSTOLIC BLOOD PRESSURE: 128 MMHG | HEIGHT: 61 IN | HEART RATE: 84 BPM | OXYGEN SATURATION: 99 % | TEMPERATURE: 97.6 F | BODY MASS INDEX: 26.81 KG/M2 | WEIGHT: 142 LBS | DIASTOLIC BLOOD PRESSURE: 80 MMHG

## 2024-11-19 DIAGNOSIS — F41.9 ANXIETY: ICD-10-CM

## 2024-11-19 DIAGNOSIS — F17.210 CIGARETTE NICOTINE DEPENDENCE, UNCOMPLICATED: ICD-10-CM

## 2024-11-19 DIAGNOSIS — R53.83 OTHER FATIGUE: ICD-10-CM

## 2024-11-19 DIAGNOSIS — C32.0 VOCAL CORD CANCER (HCC): ICD-10-CM

## 2024-11-19 DIAGNOSIS — R53.83 OTHER FATIGUE: Primary | ICD-10-CM

## 2024-11-19 LAB
ALBUMIN: 4.4 G/DL (ref 3.5–5.2)
ALP BLD-CCNC: 103 U/L (ref 35–104)
ALT SERPL-CCNC: 16 U/L (ref 0–32)
ANION GAP SERPL CALCULATED.3IONS-SCNC: 17 MMOL/L (ref 7–16)
AST SERPL-CCNC: 24 U/L (ref 0–31)
BASOPHILS ABSOLUTE: 0.04 K/UL (ref 0–0.2)
BASOPHILS RELATIVE PERCENT: 1 % (ref 0–2)
BILIRUB SERPL-MCNC: 0.3 MG/DL (ref 0–1.2)
BUN BLDV-MCNC: 12 MG/DL (ref 6–20)
CALCIUM SERPL-MCNC: 9.5 MG/DL (ref 8.6–10.2)
CHLORIDE BLD-SCNC: 101 MMOL/L (ref 98–107)
CHOLESTEROL, TOTAL: 197 MG/DL
CO2: 22 MMOL/L (ref 22–29)
CREAT SERPL-MCNC: 0.7 MG/DL (ref 0.5–1)
EOSINOPHILS ABSOLUTE: 0.09 K/UL (ref 0.05–0.5)
EOSINOPHILS RELATIVE PERCENT: 1 % (ref 0–6)
GFR, ESTIMATED: >90 ML/MIN/1.73M2
GLUCOSE BLD-MCNC: 90 MG/DL (ref 74–99)
HCT VFR BLD CALC: 42.5 % (ref 34–48)
HDLC SERPL-MCNC: 62 MG/DL
HEMOGLOBIN: 14.1 G/DL (ref 11.5–15.5)
IMMATURE GRANULOCYTES %: 1 % (ref 0–5)
IMMATURE GRANULOCYTES ABSOLUTE: 0.04 K/UL (ref 0–0.58)
LDL CHOLESTEROL: 98 MG/DL
LYMPHOCYTES ABSOLUTE: 2.53 K/UL (ref 1.5–4)
LYMPHOCYTES RELATIVE PERCENT: 30 % (ref 20–42)
MCH RBC QN AUTO: 33.7 PG (ref 26–35)
MCHC RBC AUTO-ENTMCNC: 33.2 G/DL (ref 32–34.5)
MCV RBC AUTO: 101.4 FL (ref 80–99.9)
MONOCYTES ABSOLUTE: 0.73 K/UL (ref 0.1–0.95)
MONOCYTES RELATIVE PERCENT: 9 % (ref 2–12)
NEUTROPHILS ABSOLUTE: 4.99 K/UL (ref 1.8–7.3)
NEUTROPHILS RELATIVE PERCENT: 59 % (ref 43–80)
PDW BLD-RTO: 13 % (ref 11.5–15)
PLATELET # BLD: 245 K/UL (ref 130–450)
PMV BLD AUTO: 10.9 FL (ref 7–12)
POTASSIUM SERPL-SCNC: 4.1 MMOL/L (ref 3.5–5)
RBC # BLD: 4.19 M/UL (ref 3.5–5.5)
SODIUM BLD-SCNC: 140 MMOL/L (ref 132–146)
THYROXINE (T4): 5.9 UG/DL (ref 4.5–11.7)
TOTAL PROTEIN: 7.2 G/DL (ref 6.4–8.3)
TRIGL SERPL-MCNC: 184 MG/DL
TSH SERPL DL<=0.05 MIU/L-ACNC: 1.36 UIU/ML (ref 0.27–4.2)
VLDLC SERPL CALC-MCNC: 37 MG/DL
WBC # BLD: 8.4 K/UL (ref 4.5–11.5)

## 2024-11-19 RX ORDER — VARENICLINE TARTRATE 0.5 MG/1
TABLET, FILM COATED ORAL
Qty: 60 TABLET | Refills: 1 | Status: SHIPPED | OUTPATIENT
Start: 2024-11-19

## 2024-11-19 NOTE — PROGRESS NOTES
Disp: 30 tablet, Rfl: 5    nicotine (NICODERM CQ) 21 MG/24HR, Place 1 patch onto the skin every 24 hours, Disp: 30 patch, Rfl: 3    No Known Allergies    Social History     Tobacco Use    Smoking status: Former     Current packs/day: 0.00     Average packs/day: 0.5 packs/day for 10.0 years (5.0 ttl pk-yrs)     Types: Cigarettes     Start date: 2013     Quit date: 2023     Years since quittin.7    Smokeless tobacco: Never   Vaping Use    Vaping status: Never Used   Substance Use Topics    Alcohol use: Yes     Comment: SOCIALLY    Drug use: No      Past Surgical History:   Procedure Laterality Date     SECTION N/A 2021     SECTION performed by Bryan Jain MD at [facility] L&D OR     SECTION, LOW TRANSVERSE      LARYNGOSCOPY Bilateral 2023    DIRECT LARYNGOSCOPY WITH BIOPSY performed by Jules Freedman DO at [facility] OR    LARYNGOSCOPY N/A 3/3/2023    DIRECT LARYNGOSCOPY WITH VOCAL CORD BIOPSY WITH TUMOR DEBULKING performed by Jules Freedman DO at [facility] OR    TUBAL LIGATION       Family History   Problem Relation Age of Onset    Cancer Mother     Asthma Mother     Heart Disease Mother     High Blood Pressure Mother     High Blood Pressure Father     Cancer Maternal Aunt     Cancer Maternal Uncle      Past Medical History:   Diagnosis Date    Cancer (HCC)     left vocal cord    Clotting disorder (HCC)     DURNING PREGNANCY    Hoarseness of voice     Palpitations     NOT RTECENTLY       Vitals:    24 1533   BP: 128/80   Pulse: 84   Temp: 97.6 °F (36.4 °C)   SpO2: 99%   Weight: 64.4 kg (142 lb)   Height: 1.549 m (5' 1\")     BP Readings from Last 3 Encounters:   24 128/80   24 132/80   24 118/78        Physical Exam  Vitals and nursing note reviewed.   Constitutional:       Appearance: She is well-developed.   HENT:      Head: Normocephalic.      Right Ear: External ear normal.      Left Ear: External ear normal.      Nose: Nose normal.

## 2024-11-21 LAB
EBV EARLY ANTIGEN IGG: 41 U/ML
EBV INTERPRETATION: ABNORMAL
EBV NUCLEAR AG AB: 240 U/ML
EPSTEIN-BARR VCA IGG: 680 U/ML
EPSTEIN-BARR VCA IGM: 23 U/ML

## 2025-01-02 ENCOUNTER — OFFICE VISIT (OUTPATIENT)
Dept: PRIMARY CARE CLINIC | Age: 40
End: 2025-01-02

## 2025-01-02 VITALS
SYSTOLIC BLOOD PRESSURE: 118 MMHG | DIASTOLIC BLOOD PRESSURE: 80 MMHG | WEIGHT: 139 LBS | TEMPERATURE: 97.7 F | BODY MASS INDEX: 26.24 KG/M2 | HEIGHT: 61 IN | OXYGEN SATURATION: 98 % | HEART RATE: 86 BPM

## 2025-01-02 DIAGNOSIS — R53.83 OTHER FATIGUE: ICD-10-CM

## 2025-01-02 DIAGNOSIS — C32.0 SQUAMOUS CELL CARCINOMA OF LEFT VOCAL CORD (HCC): ICD-10-CM

## 2025-01-02 DIAGNOSIS — R16.1 ENLARGEMENT OF SPLEEN: ICD-10-CM

## 2025-01-02 DIAGNOSIS — R53.83 OTHER FATIGUE: Primary | ICD-10-CM

## 2025-01-02 DIAGNOSIS — F41.9 ANXIETY: ICD-10-CM

## 2025-01-02 LAB
ALBUMIN: 4.5 G/DL (ref 3.5–5.2)
ALP BLD-CCNC: 93 U/L (ref 35–104)
ALT SERPL-CCNC: 12 U/L (ref 0–32)
ANION GAP SERPL CALCULATED.3IONS-SCNC: 10 MMOL/L (ref 7–16)
AST SERPL-CCNC: 19 U/L (ref 0–31)
BASOPHILS ABSOLUTE: 0.03 K/UL (ref 0–0.2)
BASOPHILS RELATIVE PERCENT: 0 % (ref 0–2)
BILIRUB SERPL-MCNC: 0.4 MG/DL (ref 0–1.2)
BUN BLDV-MCNC: 10 MG/DL (ref 6–20)
C-REACTIVE PROTEIN: <3 MG/L (ref 0–5)
CALCIUM SERPL-MCNC: 9.6 MG/DL (ref 8.6–10.2)
CHLORIDE BLD-SCNC: 99 MMOL/L (ref 98–107)
CO2: 28 MMOL/L (ref 22–29)
CREAT SERPL-MCNC: 0.7 MG/DL (ref 0.5–1)
EOSINOPHILS ABSOLUTE: 0.11 K/UL (ref 0.05–0.5)
EOSINOPHILS RELATIVE PERCENT: 2 % (ref 0–6)
GFR, ESTIMATED: >90 ML/MIN/1.73M2
GLUCOSE BLD-MCNC: 72 MG/DL (ref 74–99)
HCT VFR BLD CALC: 43.5 % (ref 34–48)
HEMOGLOBIN: 14.3 G/DL (ref 11.5–15.5)
IMMATURE GRANULOCYTES %: 0 % (ref 0–5)
IMMATURE GRANULOCYTES ABSOLUTE: 0.03 K/UL (ref 0–0.58)
LYMPHOCYTES ABSOLUTE: 2.15 K/UL (ref 1.5–4)
LYMPHOCYTES RELATIVE PERCENT: 29 % (ref 20–42)
MCH RBC QN AUTO: 33 PG (ref 26–35)
MCHC RBC AUTO-ENTMCNC: 32.9 G/DL (ref 32–34.5)
MCV RBC AUTO: 100.5 FL (ref 80–99.9)
MONOCYTES ABSOLUTE: 0.55 K/UL (ref 0.1–0.95)
MONOCYTES RELATIVE PERCENT: 7 % (ref 2–12)
NEUTROPHILS ABSOLUTE: 4.58 K/UL (ref 1.8–7.3)
NEUTROPHILS RELATIVE PERCENT: 61 % (ref 43–80)
PDW BLD-RTO: 12 % (ref 11.5–15)
PLATELET # BLD: 214 K/UL (ref 130–450)
PMV BLD AUTO: 10.6 FL (ref 7–12)
POTASSIUM SERPL-SCNC: 5 MMOL/L (ref 3.5–5)
RBC # BLD: 4.33 M/UL (ref 3.5–5.5)
SODIUM BLD-SCNC: 137 MMOL/L (ref 132–146)
THYROXINE (T4): 5.2 UG/DL (ref 4.5–11.7)
TOTAL PROTEIN: 7.3 G/DL (ref 6.4–8.3)
TSH SERPL DL<=0.05 MIU/L-ACNC: 1.33 UIU/ML (ref 0.27–4.2)
WBC # BLD: 7.5 K/UL (ref 4.5–11.5)

## 2025-01-02 ASSESSMENT — PATIENT HEALTH QUESTIONNAIRE - PHQ9
2. FEELING DOWN, DEPRESSED OR HOPELESS: NOT AT ALL
SUM OF ALL RESPONSES TO PHQ QUESTIONS 1-9: 0
SUM OF ALL RESPONSES TO PHQ QUESTIONS 1-9: 0
SUM OF ALL RESPONSES TO PHQ9 QUESTIONS 1 & 2: 0
1. LITTLE INTEREST OR PLEASURE IN DOING THINGS: NOT AT ALL
SUM OF ALL RESPONSES TO PHQ QUESTIONS 1-9: 0
SUM OF ALL RESPONSES TO PHQ QUESTIONS 1-9: 0

## 2025-01-02 ASSESSMENT — ENCOUNTER SYMPTOMS
ALLERGIC/IMMUNOLOGIC NEGATIVE: 1
GASTROINTESTINAL NEGATIVE: 1
RESPIRATORY NEGATIVE: 1
EYES NEGATIVE: 1

## 2025-01-02 NOTE — PROGRESS NOTES
capsule, Rfl: 5    fexofenadine-pseudoephedrine (ALLEGRA-D 24HR) 180-240 MG per extended release tablet, Take 1 tablet by mouth daily, Disp: 30 tablet, Rfl: 5    nicotine (NICODERM CQ) 21 MG/24HR, Place 1 patch onto the skin every 24 hours, Disp: 30 patch, Rfl: 3    No Known Allergies    Social History     Tobacco Use    Smoking status: Former     Current packs/day: 0.00     Average packs/day: 0.5 packs/day for 10.0 years (5.0 ttl pk-yrs)     Types: Cigarettes     Start date: 2013     Quit date: 2023     Years since quittin.8    Smokeless tobacco: Never   Vaping Use    Vaping status: Never Used   Substance Use Topics    Alcohol use: Yes     Comment: SOCIALLY    Drug use: No      Past Surgical History:   Procedure Laterality Date     SECTION N/A 2021     SECTION performed by Bryan Jain MD at [facility] L&D OR     SECTION, LOW TRANSVERSE      LARYNGOSCOPY Bilateral 2023    DIRECT LARYNGOSCOPY WITH BIOPSY performed by Jules Freedman DO at [facility] OR    LARYNGOSCOPY N/A 3/3/2023    DIRECT LARYNGOSCOPY WITH VOCAL CORD BIOPSY WITH TUMOR DEBULKING performed by Jules Freedman DO at [facility] OR    TUBAL LIGATION       Family History   Problem Relation Age of Onset    Cancer Mother     Asthma Mother     Heart Disease Mother     High Blood Pressure Mother     High Blood Pressure Father     Cancer Maternal Aunt     Cancer Maternal Uncle      Past Medical History:   Diagnosis Date    Cancer (HCC)     left vocal cord    Clotting disorder (HCC)     DURNING PREGNANCY    Hoarseness of voice     Palpitations     NOT RTECENTLY       Vitals:    25 1611   BP: 118/80   Pulse: 86   Temp: 97.7 °F (36.5 °C)   SpO2: 98%   Weight: 63 kg (139 lb)   Height: 1.549 m (5' 1\")     BP Readings from Last 3 Encounters:   25 118/80   24 128/80   24 132/80        Physical Exam  Vitals and nursing note reviewed.   Constitutional:       Appearance: She is

## 2025-01-23 ENCOUNTER — APPOINTMENT (OUTPATIENT)
Dept: OTOLARYNGOLOGY | Facility: CLINIC | Age: 40
End: 2025-01-23
Payer: COMMERCIAL

## 2025-01-23 VITALS — WEIGHT: 135 LBS | BODY MASS INDEX: 25.49 KG/M2 | HEIGHT: 61 IN

## 2025-01-23 DIAGNOSIS — Z08 ENCOUNTER FOR FOLLOW-UP SURVEILLANCE OF LARYNGEAL CANCER: Primary | ICD-10-CM

## 2025-01-23 DIAGNOSIS — R49.0 MUSCLE TENSION DYSPHONIA: ICD-10-CM

## 2025-01-23 DIAGNOSIS — Z85.21 ENCOUNTER FOR FOLLOW-UP SURVEILLANCE OF LARYNGEAL CANCER: Primary | ICD-10-CM

## 2025-01-23 DIAGNOSIS — R49.8 OTHER VOICE AND RESONANCE DISORDERS: ICD-10-CM

## 2025-01-23 DIAGNOSIS — Z85.21 HX OF LARYNGEAL CANCER: ICD-10-CM

## 2025-01-23 PROCEDURE — 31579 LARYNGOSCOPY TELESCOPIC: CPT | Performed by: OTOLARYNGOLOGY

## 2025-01-23 PROCEDURE — 99214 OFFICE O/P EST MOD 30 MIN: CPT | Performed by: OTOLARYNGOLOGY

## 2025-01-23 PROCEDURE — 3008F BODY MASS INDEX DOCD: CPT | Performed by: OTOLARYNGOLOGY

## 2025-01-23 ASSESSMENT — PATIENT HEALTH QUESTIONNAIRE - PHQ9
SUM OF ALL RESPONSES TO PHQ9 QUESTIONS 1 AND 2: 0
2. FEELING DOWN, DEPRESSED OR HOPELESS: NOT AT ALL
1. LITTLE INTEREST OR PLEASURE IN DOING THINGS: NOT AT ALL

## 2025-01-23 NOTE — PROGRESS NOTES
ASSESSMENT AND PLAN:   Divya Valle is a 39 y.o. female with a history of laryngeal cancer, here for follow up. No concerns for her voice.   She has stiffness of her left TVC. No vascular patterns of her larynx.    She will follow up in 4 months.          Reason For Consult  No chief complaint on file.       HISTORY OF PRESENT ILLNESS:  Divya Valle is a 39 y.o. female presenting for a follow up visit with me for a history of a [id number] SCCa.      The patient reports fatigue and had a recent US of thyroid.          Prior History:   Seen last on 10/17/2024  with a history of a [id number] SCCa, who is here for surveillance.       Today's examination to include stroboscopy demonstrates no concerning lesions. She hs a vascular ectasia on the right TVC. No lymphadenopathy. She has concerns for LPR/globus sensation and muscle tightness. He previous imaging was stable.       We discussed Gaviscon after meals and bedtime.      I would like to see the patient back in 3 months.      Past Medical History  She has no past medical history on file. Surgical History  She has no past surgical history on file.   Social History  She reports that she has quit smoking. Her smoking use included cigarettes. She has a 20 pack-year smoking history. She has never been exposed to tobacco smoke. She has never used smokeless tobacco. No history on file for alcohol use and drug use. Allergies  Patient has no known allergies.     Family History  No family history on file.    Review of Systems  All 10 systems were reviewed and negative except for above.      Last Recorded Vitals  There were no vitals taken for this visit.    Physical Exam  ENT Physical Exam  Constitutional  Appearance: patient appears well-developed and well-nourished,  Head and Face  Appearance: head appears normal and face appears normal;  Ear  Auricles: right auricle normal; left auricle normal;  Nose  External Nose: nares patent bilaterally;  Oral Cavity/Oropharynx  Lips:  normal;  Neck  Neck: neck normal; neck palpation normal;  Respiratory  Inspection: no retractions visible;  Cardiovascular  Inspection: no peripheral edema present;  Neurovestibular  Mental Status: alert and oriented;  Psychiatric: mood normal;  Cranial Nerves: cranial nerves intact;           Procedures     Flexible Laryngoscopy w/ Videostroboscopy    VOICE AND SPEECH CHARACTERISTICS:  Normal spoken speech, no dysphonia, no roughness, no breathiness, no asthenia, no strain.    Intelligibility: normal.   Resonance: balanced.   Vocal Loudness: normal.   Breath Support: normal.    PROCEDURE:    Indications: voice change  PROCEDURE NOTE: FLEXIBLE LARYNGOSCOPY WITH STROBOSCOPY  I recommended a flexible laryngoscopy with stroboscopy based on PE findings, and/or concern for mucosal wave details based upon history and/or for issues associated with hyperreflexic gag on mirror exam concerning for pathology. Risks, benefits, and alternatives were explained. The patient wishes to proceed and gives verbal consent.   Patient is seated in the exam chair. After adequate topical anesthesia, I advance the flexible endoscope. The examination included evaluation of the malone, vallecula, base of tongue, pyriforms, post-cricoid area, larynx and immediate subglottis.  Findings : assessment of the nasopharynx, base of tongue/vallecula, pyriform sinuses, post-cricoid area and pharyngeal walls was without lesion or mass, pharyngeal wall contraction is normal and symmetric, and no pooling of secretions  Reflux finding score: 0/26  (>7 95% significant)  Gross Arytenoid Movement: symmetric.  Arytenoid Height: normal.   Supraglottic Tension: lateral.  Symmetry: asymmetry.   Amplitude: reduced: left.  Phase Closure: in-phase.  Mucosal Wave Lateral Excursion/Secondary Wave: Left Vocal Cord: mild restriction - Wave moved more than ¼, less than ½ the width of the vocal fold.  Periodicity: normal.  Closure: post. gap.    Time Spent  Prep time on day  of patient encounter: 10 minutes  Time spent directly with patient, family or caregiver: 15 minutes  Additional Time Spent on Patient Care Activities/Discussion with SLP re care plan: 5 minutes  Documentation Time: 10 minutes  Other Time Spent: 0 minutes  Total: 40 minutes     Scribe Attestation  By signing my name below, Edda OWEN , Scribsalbador attest that this documentation has been prepared under the direction and in the presence of Del Currie MD.

## 2025-03-18 ENCOUNTER — OFFICE VISIT (OUTPATIENT)
Dept: PRIMARY CARE CLINIC | Age: 40
End: 2025-03-18

## 2025-03-18 VITALS
HEIGHT: 61 IN | OXYGEN SATURATION: 98 % | SYSTOLIC BLOOD PRESSURE: 110 MMHG | DIASTOLIC BLOOD PRESSURE: 80 MMHG | TEMPERATURE: 97.7 F | BODY MASS INDEX: 27 KG/M2 | WEIGHT: 143 LBS | HEART RATE: 86 BPM

## 2025-03-18 DIAGNOSIS — C32.0 VOCAL CORD CANCER (HCC): ICD-10-CM

## 2025-03-18 DIAGNOSIS — F41.9 ANXIETY: ICD-10-CM

## 2025-03-18 DIAGNOSIS — R53.83 OTHER FATIGUE: Primary | ICD-10-CM

## 2025-03-18 DIAGNOSIS — C32.0 SQUAMOUS CELL CARCINOMA OF LEFT VOCAL CORD: ICD-10-CM

## 2025-03-18 DIAGNOSIS — F17.210 CIGARETTE NICOTINE DEPENDENCE, UNCOMPLICATED: ICD-10-CM

## 2025-03-18 RX ORDER — FEXOFENADINE HCL AND PSEUDOEPHEDRINE HCL 180; 240 MG/1; MG/1
1 TABLET, EXTENDED RELEASE ORAL DAILY
Qty: 90 TABLET | Refills: 3 | Status: SHIPPED | OUTPATIENT
Start: 2025-03-18

## 2025-03-18 RX ORDER — VARENICLINE TARTRATE 0.5 MG/1
TABLET, FILM COATED ORAL
Qty: 180 TABLET | Refills: 0 | Status: SHIPPED | OUTPATIENT
Start: 2025-03-18

## 2025-03-18 NOTE — PROGRESS NOTES
3/18/25     Mar Muir    : 1985 Sex: female   Age: 39 y.o.      No chief complaint on file.      Prior to Admission medications    Medication Sig Start Date End Date Taking? Authorizing Provider   varenicline (CHANTIX) 0.5 MG tablet 1 bid 24   Reynaldo Parekh DO   FLUoxetine (PROZAC) 20 MG capsule Take 1 capsule by mouth daily 24   Reynaldo Parekh DO   fexofenadine-pseudoephedrine (ALLEGRA-D 24HR) 180-240 MG per extended release tablet Take 1 tablet by mouth daily 24   Reynaldo Parekh DO   nicotine (NICODERM CQ) 21 MG/24HR Place 1 patch onto the skin every 24 hours 24  Reynaldo Parekh DO          HPI: Patient evaluated today issues of fatigue anxiety medical cord cancer and has been stable.  Recent problems with some abdominal discomfort ultrasound study unremarkable.  If she does have persistent or recurring problems would consider CT of the abdomen if needed.  Lab studies have all been very stable.  She is doing well on Chantix so we will continue.  Has reduced smoking significantly but still occasionally cigarettes through the week.          Review of Systems   Constitutional:  Positive for fatigue.   HENT: Negative.     Eyes: Negative.    Respiratory: Negative.     Gastrointestinal: Negative.    Endocrine: Negative.    Genitourinary: Negative.    Musculoskeletal: Negative.    Skin: Negative.    Allergic/Immunologic: Negative.    Neurological: Negative.    Hematological: Negative.    Psychiatric/Behavioral: Negative.        Today systems reviewed overall stable meds as prescribed.        Current Outpatient Medications:     varenicline (CHANTIX) 0.5 MG tablet, 1 bid, Disp: 60 tablet, Rfl: 1    FLUoxetine (PROZAC) 20 MG capsule, Take 1 capsule by mouth daily, Disp: 30 capsule, Rfl: 5    fexofenadine-pseudoephedrine (ALLEGRA-D 24HR) 180-240 MG per extended release tablet, Take 1 tablet by mouth daily, Disp: 30 tablet, Rfl: 5    nicotine (NICODERM CQ) 21

## 2025-05-29 ENCOUNTER — HOSPITAL ENCOUNTER (OUTPATIENT)
Dept: RADIOLOGY | Facility: CLINIC | Age: 40
Discharge: HOME | End: 2025-05-29
Payer: COMMERCIAL

## 2025-05-29 ENCOUNTER — APPOINTMENT (OUTPATIENT)
Dept: OTOLARYNGOLOGY | Facility: CLINIC | Age: 40
End: 2025-05-29
Payer: COMMERCIAL

## 2025-05-29 DIAGNOSIS — Z85.21 HX OF LARYNGEAL CANCER: ICD-10-CM

## 2025-05-29 DIAGNOSIS — R49.0 VOICE HOARSENESS: Primary | ICD-10-CM

## 2025-05-29 DIAGNOSIS — R49.8 VOICE FATIGUE: ICD-10-CM

## 2025-05-29 DIAGNOSIS — R49.8 OTHER VOICE AND RESONANCE DISORDERS: ICD-10-CM

## 2025-05-29 DIAGNOSIS — R47.89 BREATHY VOICE QUALITY: ICD-10-CM

## 2025-05-29 PROCEDURE — 99214 OFFICE O/P EST MOD 30 MIN: CPT | Performed by: OTOLARYNGOLOGY

## 2025-05-29 PROCEDURE — 71046 X-RAY EXAM CHEST 2 VIEWS: CPT

## 2025-05-29 PROCEDURE — 31579 LARYNGOSCOPY TELESCOPIC: CPT | Performed by: OTOLARYNGOLOGY

## 2025-05-29 PROCEDURE — 1036F TOBACCO NON-USER: CPT | Performed by: OTOLARYNGOLOGY

## 2025-05-29 NOTE — PROGRESS NOTES
ASSESSMENT AND PLAN:   Divya Valle is a 40 y.o. female with a history of a T1b laryngeal cancer.  She had areas on the tongue that she wanted use to take a look at. These appears to be salivary tissue that did not appear concerning on scope exam. She has mucoepithelial cyst on the BOT that appear benign.     Today's examination to include stroboscopy/ flexible laryngoscopy demonstrates no masses. She has left scar that causes a mild asymmetric, otherwise normal exam for the patient.    I would like to see the patient back in  3-4 months for a repeat evaluation. Wewill obtain a CXR for routine surveillance.        Assessment & Plan  Patient presents with no immediate concerns and a history of T1b vocal cord cancer who was found to have the following findings on stroboscopy: no concerning lesions on vocal cords and physical exam: benign mucoepithelial cysts at base of tongue, mild scarring on left vocal cord causing slight asymmetry, overall findings within normal limits.    We discussed options for management to include: routine chest x-ray for surveillance, monitoring mucoepithelial cysts for changes (bleeding, significant growth), further evaluation if concerning symptoms develop.    Follow-up: Follow-up in 3-4 months for repeat evaluation.    PROCEDURE  Procedure Performed  Stroboscopy    Pre-Procedure Diagnosis  Discussed visualization of vocal cords and potential findings    Complications  None     Reason For Consult  No chief complaint on file.       HISTORY OF PRESENT ILLNESS:  Divya Valle is a 40 y.o. female presenting for a follow up visit with me for cancer surveillance.  The patient reports her voice has been stable. She has notice increased PND due to possible seasonal allergies. She has noticed intermittent pitch breaks in her voice.         Prior History:   Last seen on 01/23/2025 with a history of laryngeal cancer, here for follow up. No concerns for her voice.   She has stiffness of her left TVC. No  vascular patterns of her larynx.    History of Present Illness  Divya Bocanegra is a 40-year-old female with a history of T1b squamous cell cancer, here for a check-up. She was last seen on January 23, 2025, and her last chest x-ray on April 25, 2024, was clear.    Emotional Distress  - Divya has been going through a tough time emotionally over the past two weeks because her mother, who had COPD and emphysema, passed away in her sleep at the age of 60.  - This loss has been particularly hard on her and her oldest child.    Sinus Drainage and Related Symptoms  - Divya reports increased sinus drainage, which she attributes to postpartum sinus issues that started after having her kids.  - She feels puffy, gets headaches, and frequently clears her throat.  - She has noticed a slight change in her voice over the past two weeks, which she thinks might be related to her emotional state and crying.  - She also feels like there's something on one side of her throat and has noticed a bump at the back of her tongue.  - She doesn't have drainage from one side of her nose more than the other.    Supplemental information: Divya has had multiple vocal cord surgeries 2-3 years ago.    FAMILY HISTORY  - Mother: COPD, emphysema        Past Medical History  She has no past medical history on file. Surgical History  She has no past surgical history on file.   Social History  She reports that she has quit smoking. Her smoking use included cigarettes. She has a 20 pack-year smoking history. She has never been exposed to tobacco smoke. She has never used smokeless tobacco. No history on file for alcohol use and drug use. Allergies  Patient has no known allergies.     Family History  Family History[1]    Review of Systems  All 10 systems were reviewed and negative except for above.      Last Recorded Vitals  There were no vitals taken for this visit.    Physical Exam  ENT Physical Exam  Constitutional  Appearance: patient appears  well-developed and well-nourished,  Head and Face  Appearance: head appears normal and face appears normal;  Ear  Auricles: right auricle normal; left auricle normal;  Nose  External Nose: nares patent bilaterally;  Oral Cavity/Oropharynx  Lips: normal;  Neck  Neck: neck normal; neck palpation normal;  Respiratory  Inspection: no retractions visible;  Cardiovascular  Inspection: no peripheral edema present;  Neurovestibular  Mental Status: alert and oriented;  Psychiatric: mood normal;  Cranial Nerves: cranial nerves intact;        Physical Exam  **PHYSICAL EXAMINATION:**    **Appearance:** Patient appears well-developed, well-nourished, no acute distress.  **Head/Face:** Symmetric facial features, no masses/lesions.  **Salivary Glands:** Parotid/submandibular glands normal bilaterally.  **Ears:** Right auricle normal; left auricle normal.  **External/Internal Nose:** Right nasal cavity normal, left side congested.  **Oral Cavity:** Tongue normal, small cysts at base of tongue on right.  **Pharynx:** No tonsillar hypertrophy/erythema.  **Neck Palpation:** Normal, no lymphadenopathy.  **Respiratory:** Breathing comfortably, no stridor/retractions.  **CV/Extremities:** No clubbing/cyanosis/peripheral edema.  **Cranial Nerves:** II-XII grossly intact and symmetric.  **Mood/Affect:** Appropriate, no agitation.    **STROBOSCOPY:**    **VOICE AND SPEECH CHARACTERISTICS:**  Mild scarring of left vocal cord causing slight asymmetry. No concerning lesions on narrowband imaging or stroboscopy. Mild interarytenoid thickening and mild vocal fold edema. Asymmetric symmetry with reduced amplitude on left. In phase and aperiodic with mild restriction on left. Posterior gap present.  **Grade:** Normal - 0.  **Roughness:** Absent - 0.  **Breathiness:** Absent - 0.  **Asthenia:** Absent - 0.  **Strain:** Absent - 0.    **Additional Voice Characteristics:**  **Pitch:** Within normal range.  **Intelligibility:** Normal.  **Resonance:**  Balanced.  **Vocal Loudness:** Adequate.  **Breath Support:** Sustained.    **Reflux Finding Score:**  **Subglottic edema:** Absent.  **Thick Mucus:** Absent.  **Granuloma:** Absent.  **Ventricular Obliteration:** Absent.  **Erythema/Hyperemia:** Absent.  **IA Thickening:** Mild interarytenoid thickening.  **TVC Edema:** Mild vocal fold edema.  **Diffuse Laryngitis:** Absent.    **PROCEDURE:**  **Indications:** Per HPI.  **Technique:** Patient seated, topical anesthesia applied; flexible endoscope advanced to evaluate nasopharynx, oropharynx, hypopharynx, larynx, and subglottis.  **Gross Arytenoid Movement:** Symmetric.  **Height:** Equal bilaterally.  **Supraglottic Tension:** Normal.  **Symmetry:** Asymmetric symmetry with reduced amplitude on left.  **Amplitude:** Reduced amplitude on left.  **Closure:** Posterior gap present.  **Mucosal Wave Lateral Excursion/Secondary Wave:** Full bilaterally.  **Periodicity:** In phase and aperiodic with mild restriction on left.  **Closure:** Posterior gap present.    Results  - Imaging:    - Chest x-ray (04/25/2024): Clear    - Diagnostic Testing:    - Stroboscopy:      - Grade: 98041      - Breathiness: 1      - Asthenia: 0      - Strain: 0      - Intelligibility: normal      - Resonance: balanced      - Vocal loudness: normal      - Breath support: normal      - Subglottic edema: absent      - Thick mucus: absent      - Granuloma: absent      - Ventricular obliteration: absent      - Erythema: absent      - Mild interarytenoid thickening      - Mild vocal fold edema      - No diffuse laryngitis      - Gross arytenoids: symmetric      - Arytenoid height: normal      - Supraglottic tension: lateral      - Asymmetric symmetry with reduced amplitude on left      - In phase and aperiodic with mild restriction on left      - Posterior gap: present      Procedures   Flexible Laryngoscopy w/ Videostroboscopy    VOICE AND SPEECH CHARACTERISTICS:  Normal spoken speech, (+) mild  dysphonia, no roughness, (+) mild breathiness, no asthenia, no strain.    Intelligibility: normal.   Resonance: balanced.   Vocal Loudness: normal.   Breath Support: normal.    PROCEDURE:    Indications: voice change  PROCEDURE NOTE: FLEXIBLE LARYNGOSCOPY WITH STROBOSCOPY  I recommended a flexible laryngoscopy with stroboscopy based on PE findings, and/or concern for mucosal wave details based upon history and/or for issues associated with hyperreflexic gag on mirror exam concerning for pathology. Risks, benefits, and alternatives were explained. The patient wishes to proceed and gives verbal consent.   Patient is seated in the exam chair. After adequate topical anesthesia, I advance the flexible endoscope. The examination included  IA thickenin (mild) and TVC edema: 1 (mild)  Gross Arytenoid Movement: symmetric.  Arytenoid Height: normal.   Supraglottic Tension: lateral.  Symmetry: asymmetry.   Amplitude: reduced: left.  Phase Closure: in-phase.  Mucosal Wave Lateral Excursion/Secondary Wave: Left Vocal Cord: mild restriction - Wave moved more than ¼, less than ½ the width of the vocal fold.  Periodicity: aperiodic.  Closure: post. gap.    Results  - Imaging:    - Chest x-ray (2024): Clear    - Diagnostic Testing:    - Stroboscopy:      - Grade: [id number]      - Breathiness: 1      - Asthenia: 0      - Strain: 0      - Intelligibility: normal      - Resonance: balanced      - Vocal loudness: normal      - Breath support: normal      - Subglottic edema: absent      - Thick mucus: absent      - Granuloma: absent      - Ventricular obliteration: absent      - Erythema: absent      - Mild interarytenoid thickening      - Mild vocal fold edema      - No diffuse laryngitis      - Gross arytenoids: symmetric      - Arytenoid height: normal      - Supraglottic tension: lateral      - Asymmetric symmetry with reduced amplitude on left      - In phase and aperiodic with mild restriction on left      - Posterior gap:  present       Time Spent  Prep time on day of patient encounter: 10 minutes  Time spent directly with patient, family or caregiver: 15 minutes  Additional Time Spent on Patient Care Activities/Discussion with SLP re care plan: 5 minutes  Documentation Time: 10 minutes  Other Time Spent: 0 minutes  Total: 40 minutes     Scribe Attestation  By signing my name below, I, Edda Mckoy , Scribe attest that this documentation has been prepared under the direction and in the presence of Del Currie MD.      This medical note was created with the assistance of artificial intelligence (AI) for documentation purposes. The content has been reviewed and confirmed by the healthcare provider for accuracy and completeness. Patient consented to the use of audio recording and use of AI during their visit.           [1] No family history on file.

## 2025-06-18 ENCOUNTER — OFFICE VISIT (OUTPATIENT)
Dept: PRIMARY CARE CLINIC | Age: 40
End: 2025-06-18

## 2025-06-18 VITALS
BODY MASS INDEX: 25.49 KG/M2 | SYSTOLIC BLOOD PRESSURE: 132 MMHG | OXYGEN SATURATION: 98 % | WEIGHT: 135 LBS | DIASTOLIC BLOOD PRESSURE: 82 MMHG | HEIGHT: 61 IN | HEART RATE: 96 BPM | TEMPERATURE: 97.8 F

## 2025-06-18 DIAGNOSIS — C32.0 VOCAL CORD CANCER (HCC): ICD-10-CM

## 2025-06-18 DIAGNOSIS — R53.83 OTHER FATIGUE: Primary | ICD-10-CM

## 2025-06-18 DIAGNOSIS — C32.0 SQUAMOUS CELL CARCINOMA OF LEFT VOCAL CORD (HCC): ICD-10-CM

## 2025-06-18 DIAGNOSIS — F41.9 ANXIETY: ICD-10-CM

## 2025-06-18 RX ORDER — VARENICLINE TARTRATE 0.5 MG/1
TABLET, FILM COATED ORAL
Qty: 180 TABLET | Refills: 0 | Status: SHIPPED | OUTPATIENT
Start: 2025-06-18

## 2025-06-18 RX ORDER — BUPROPION HYDROCHLORIDE 150 MG/1
150 TABLET ORAL EVERY MORNING
Qty: 30 TABLET | Refills: 3 | Status: SHIPPED | OUTPATIENT
Start: 2025-06-18

## 2025-06-18 RX ORDER — FAMCICLOVIR 250 MG/1
250 TABLET ORAL 2 TIMES DAILY
COMMUNITY
Start: 2025-06-10

## 2025-06-18 SDOH — ECONOMIC STABILITY: FOOD INSECURITY: WITHIN THE PAST 12 MONTHS, YOU WORRIED THAT YOUR FOOD WOULD RUN OUT BEFORE YOU GOT MONEY TO BUY MORE.: NEVER TRUE

## 2025-06-18 SDOH — ECONOMIC STABILITY: FOOD INSECURITY: WITHIN THE PAST 12 MONTHS, THE FOOD YOU BOUGHT JUST DIDN'T LAST AND YOU DIDN'T HAVE MONEY TO GET MORE.: NEVER TRUE

## 2025-06-18 NOTE — PROGRESS NOTES
150 MG extended release tablet; Take 1 tablet by mouth every morning            Return in about 6 weeks (around 7/30/2025).      Reynaldo Parekh,     Note was generated with the assistance of voice recognition software.  Document was reviewed however may contain grammatical errors.

## 2025-07-07 ENCOUNTER — OFFICE VISIT (OUTPATIENT)
Dept: FAMILY MEDICINE CLINIC | Age: 40
End: 2025-07-07

## 2025-07-07 VITALS
HEIGHT: 61 IN | DIASTOLIC BLOOD PRESSURE: 82 MMHG | OXYGEN SATURATION: 99 % | TEMPERATURE: 97.9 F | SYSTOLIC BLOOD PRESSURE: 122 MMHG | BODY MASS INDEX: 25.51 KG/M2 | HEART RATE: 94 BPM

## 2025-07-07 DIAGNOSIS — T63.441A BEE STING, ACCIDENTAL OR UNINTENTIONAL, INITIAL ENCOUNTER: Primary | ICD-10-CM

## 2025-07-07 NOTE — PROGRESS NOTES
25     Mar Muir    : 1985 Sex: female   Age: 40 y.o.      Chief Complaint   Patient presents with    Insect Bite    Arm Swelling       Prior to Admission medications    Medication Sig Start Date End Date Taking? Authorizing Provider   famciclovir (FAMVIR) 250 MG tablet Take 1 tablet by mouth 2 times daily 6/10/25  Yes Provider, MD Stevie   varenicline (CHANTIX) 0.5 MG tablet 1 bid 25  Yes Reynaldo Parekh DO   buPROPion (WELLBUTRIN XL) 150 MG extended release tablet Take 1 tablet by mouth every morning 25  Yes Reynaldo Parekh DO   fexofenadine-pseudoephedrine (ALLEGRA-D 24HR) 180-240 MG per extended release tablet Take 1 tablet by mouth daily 3/18/25  Yes Reynaldo Parekh DO   FLUoxetine (PROZAC) 20 MG capsule Take 1 capsule by mouth daily 3/18/25  Yes Reynaldo Parekh DO          HPI: Patient seen today problems with bee sting reaction left arm and hand.  Onset yesterday.  Has some swelling of the hand and forearm this afternoon.  No respiratory symptoms.  Clinically she is otherwise well.          Review of Systems   Constitutional: Negative.    HENT: Negative.     Eyes: Negative.    Respiratory: Negative.     Gastrointestinal: Negative.    Endocrine: Negative.    Genitourinary: Negative.    Musculoskeletal: Negative.    Skin: Negative.    Allergic/Immunologic: Negative.    Neurological: Negative.    Hematological: Negative.    Psychiatric/Behavioral: Negative.                Current Outpatient Medications:     famciclovir (FAMVIR) 250 MG tablet, Take 1 tablet by mouth 2 times daily, Disp: , Rfl:     varenicline (CHANTIX) 0.5 MG tablet, 1 bid, Disp: 180 tablet, Rfl: 0    buPROPion (WELLBUTRIN XL) 150 MG extended release tablet, Take 1 tablet by mouth every morning, Disp: 30 tablet, Rfl: 3    fexofenadine-pseudoephedrine (ALLEGRA-D 24HR) 180-240 MG per extended release tablet, Take 1 tablet by mouth daily, Disp: 90 tablet, Rfl: 3    FLUoxetine (PROZAC) 20 MG capsule,

## 2025-09-04 ENCOUNTER — APPOINTMENT (OUTPATIENT)
Dept: OTOLARYNGOLOGY | Facility: CLINIC | Age: 40
End: 2025-09-04
Payer: COMMERCIAL

## 2026-01-08 ENCOUNTER — APPOINTMENT (OUTPATIENT)
Dept: OTOLARYNGOLOGY | Facility: CLINIC | Age: 41
End: 2026-01-08
Payer: COMMERCIAL

## (undated) DEVICE — ENDOTRACH TUBE 7060300 LASER SHLD 6MM: Brand: LASER-SHIELD®

## (undated) DEVICE — CESAREAN BIRTH PACK II: Brand: MEDLINE INDUSTRIES, INC.

## (undated) DEVICE — SUTURE VCRL + SZ 0 L36IN ABSRB VLT L36MM CT-1 1/2 CIR VCP346H

## (undated) DEVICE — SUTURE CHROMIC GUT SZ 1 L36IN ABSRB BRN L40MM CT 1/2 CIR 915H

## (undated) DEVICE — CORD FIBEROPTIC ENT

## (undated) DEVICE — COUNTER NDL 30 COUNT DBL MAG

## (undated) DEVICE — SURGICAL PROCEDURE PACK EENT CUST

## (undated) DEVICE — SUCTION MICROLARYNGEAL SHRT

## (undated) DEVICE — KIT,ANTI FOG,W/SPONGE & FLUID,SOFT PACK: Brand: MEDLINE

## (undated) DEVICE — TUBE BLD COLLECT ST 1 SIL COAT 7ML 10ML

## (undated) DEVICE — SUCTION LARYNGEAL OPEN ENDED SHRT

## (undated) DEVICE — SHEET,DRAPE,53X77,STERILE: Brand: MEDLINE

## (undated) DEVICE — SUTURE PLN GUT SZ 3-0 L27IN ABSRB YELLOWISH TAN L36MM CT-1 842H

## (undated) DEVICE — LIGHT SOURCE BL

## (undated) DEVICE — SUTURE VCRL + SZ 4-0 L27IN ABSRB UD PS-2 3/8 CIR REV CUT VCP426H

## (undated) DEVICE — PEN: MARKING STD 100/CS: Brand: MEDICAL ACTION INDUSTRIES

## (undated) DEVICE — SET FORCEP MICROFRANCE LARYNGEAL

## (undated) DEVICE — BASIC SINGLE BASIN 1-LF: Brand: MEDLINE INDUSTRIES, INC.

## (undated) DEVICE — PROCISE MLW WAND: Brand: COBLATION

## (undated) DEVICE — GLOVE ORANGE PI 8 1/2   MSG9085

## (undated) DEVICE — MEDI-VAC YANKAUER SUCTION HANDLE W/BULBOUS TIP: Brand: CARDINAL HEALTH

## (undated) DEVICE — 3000CC GUARDIAN II: Brand: GUARDIAN

## (undated) DEVICE — ENDOTRACH TUBE 7060450 LASER SHLD 7.5MM: Brand: LASER-SHIELD®

## (undated) DEVICE — GLOVE SURG SZ 75 L12IN FNGR THK83MIL CRM POLYISOPRENE

## (undated) DEVICE — 4-PORT MANIFOLD: Brand: NEPTUNE 2

## (undated) DEVICE — TOWEL,OR,DSP,ST,BLUE,STD,6/PK,12PK/CS: Brand: MEDLINE

## (undated) DEVICE — FILTER PAPER CASSETTE BIOP PLSTC PNK HNGD

## (undated) DEVICE — PENCIL ES L3M BTTN SWCH HOLSTER W/ BLDE ELECTRD EDGE

## (undated) DEVICE — SPONGE LAP W18XL18IN WHT COT 4 PLY FLD STRUNG RADPQ DISP ST

## (undated) DEVICE — GOWN,SIRUS,FABRNF,L,20/CS: Brand: MEDLINE

## (undated) DEVICE — ELECTRODE PT RET AD L9FT HI MOIST COND ADH HYDRGEL CORDED

## (undated) DEVICE — CONTAINER,SPEC,PNEUM TUBE,3OZ,STRL PATH: Brand: MEDLINE

## (undated) DEVICE — SUCTION VELVET EYE SHRT

## (undated) DEVICE — HOLDERS JAKO

## (undated) DEVICE — SUTURE CHROMIC GUT SZ 2-0 L36IN ABSRB BRN L36MM CT-1 1/2 923H

## (undated) DEVICE — SKIN AFFIX SURG ADHESIVE 72/CS 0.55ML: Brand: MEDLINE

## (undated) DEVICE — READY WET SKIN SCRUB TRAY-LF: Brand: MEDLINE INDUSTRIES, INC.

## (undated) DEVICE — BLADE SURG NO20 S STL STR DISP GLASSVAN

## (undated) DEVICE — MARKER,SKIN,WI/RULER AND LABELS: Brand: MEDLINE

## (undated) DEVICE — COVER,LIGHT HANDLE,FLX,2/PK: Brand: MEDLINE INDUSTRIES, INC.

## (undated) DEVICE — GLOVE SURG SZ 65 L12IN FNGR THK83MIL CRM POLYISOPRENE

## (undated) DEVICE — TUBING, SUCTION, 3/16" X 12', STRAIGHT: Brand: MEDLINE

## (undated) DEVICE — CATHETERIZATION KIT FOL16 FR 2000 CC DRAINAGE BG LUBRICATH

## (undated) DEVICE — CONTAINER SPEC 64OZ POLYPR PATH SNAP LOK CAP W/ LID